# Patient Record
Sex: FEMALE | Race: WHITE | NOT HISPANIC OR LATINO | Employment: FULL TIME | URBAN - METROPOLITAN AREA
[De-identification: names, ages, dates, MRNs, and addresses within clinical notes are randomized per-mention and may not be internally consistent; named-entity substitution may affect disease eponyms.]

---

## 2017-01-02 ENCOUNTER — OFFICE VISIT - HEALTHEAST (OUTPATIENT)
Dept: FAMILY MEDICINE | Facility: CLINIC | Age: 52
End: 2017-01-02

## 2017-01-02 DIAGNOSIS — Z20.818 EXPOSURE TO STREP THROAT: ICD-10-CM

## 2017-01-02 DIAGNOSIS — J32.9 SINUSITIS: ICD-10-CM

## 2017-03-21 ENCOUNTER — HOSPITAL ENCOUNTER (OUTPATIENT)
Dept: MAMMOGRAPHY | Facility: CLINIC | Age: 52
Discharge: HOME | End: 2017-03-21
Attending: FAMILY MEDICINE

## 2017-03-21 DIAGNOSIS — Z12.31 VISIT FOR SCREENING MAMMOGRAM: ICD-10-CM

## 2017-06-21 ENCOUNTER — COMMUNICATION - HEALTHEAST (OUTPATIENT)
Dept: FAMILY MEDICINE | Facility: CLINIC | Age: 52
End: 2017-06-21

## 2017-06-21 DIAGNOSIS — I10 HYPERTENSION: ICD-10-CM

## 2017-12-26 ENCOUNTER — COMMUNICATION - HEALTHEAST (OUTPATIENT)
Dept: FAMILY MEDICINE | Facility: CLINIC | Age: 52
End: 2017-12-26

## 2017-12-26 DIAGNOSIS — I10 HYPERTENSION: ICD-10-CM

## 2018-01-12 ENCOUNTER — OFFICE VISIT - HEALTHEAST (OUTPATIENT)
Dept: FAMILY MEDICINE | Facility: CLINIC | Age: 53
End: 2018-01-12

## 2018-01-12 DIAGNOSIS — E55.9 VITAMIN D DEFICIENCY: ICD-10-CM

## 2018-01-12 DIAGNOSIS — Z12.11 SCREEN FOR COLON CANCER: ICD-10-CM

## 2018-01-12 DIAGNOSIS — I10 HYPERTENSION: ICD-10-CM

## 2018-01-12 DIAGNOSIS — I10 ESSENTIAL HYPERTENSION, BENIGN: ICD-10-CM

## 2018-01-12 LAB — TSH SERPL DL<=0.005 MIU/L-ACNC: 3.45 UIU/ML (ref 0.3–5)

## 2018-01-15 ENCOUNTER — COMMUNICATION - HEALTHEAST (OUTPATIENT)
Dept: FAMILY MEDICINE | Facility: CLINIC | Age: 53
End: 2018-01-15

## 2018-01-15 LAB — 25(OH)D3 SERPL-MCNC: 25.5 NG/ML (ref 30–80)

## 2018-02-09 ENCOUNTER — COMMUNICATION - HEALTHEAST (OUTPATIENT)
Dept: FAMILY MEDICINE | Facility: CLINIC | Age: 53
End: 2018-02-09

## 2018-02-12 ENCOUNTER — OFFICE VISIT - HEALTHEAST (OUTPATIENT)
Dept: FAMILY MEDICINE | Facility: CLINIC | Age: 53
End: 2018-02-12

## 2018-02-12 DIAGNOSIS — J01.90 ACUTE SINUSITIS: ICD-10-CM

## 2018-04-17 ENCOUNTER — COMMUNICATION - HEALTHEAST (OUTPATIENT)
Dept: FAMILY MEDICINE | Facility: CLINIC | Age: 53
End: 2018-04-17

## 2018-08-17 ENCOUNTER — AMBULATORY - HEALTHEAST (OUTPATIENT)
Dept: FAMILY MEDICINE | Facility: CLINIC | Age: 53
End: 2018-08-17

## 2019-01-11 ENCOUNTER — COMMUNICATION - HEALTHEAST (OUTPATIENT)
Dept: FAMILY MEDICINE | Facility: CLINIC | Age: 54
End: 2019-01-11

## 2019-01-11 DIAGNOSIS — I10 HYPERTENSION: ICD-10-CM

## 2019-04-15 ENCOUNTER — COMMUNICATION - HEALTHEAST (OUTPATIENT)
Dept: FAMILY MEDICINE | Facility: CLINIC | Age: 54
End: 2019-04-15

## 2019-04-15 DIAGNOSIS — I10 HYPERTENSION: ICD-10-CM

## 2019-07-16 ENCOUNTER — COMMUNICATION - HEALTHEAST (OUTPATIENT)
Dept: FAMILY MEDICINE | Facility: CLINIC | Age: 54
End: 2019-07-16

## 2019-07-16 DIAGNOSIS — I10 HYPERTENSION: ICD-10-CM

## 2019-10-04 ENCOUNTER — OFFICE VISIT - HEALTHEAST (OUTPATIENT)
Dept: FAMILY MEDICINE | Facility: CLINIC | Age: 54
End: 2019-10-04

## 2019-10-04 DIAGNOSIS — I10 ESSENTIAL HYPERTENSION, BENIGN: ICD-10-CM

## 2019-10-04 DIAGNOSIS — Z12.11 SPECIAL SCREENING FOR MALIGNANT NEOPLASMS, COLON: ICD-10-CM

## 2019-10-04 DIAGNOSIS — Z12.31 VISIT FOR SCREENING MAMMOGRAM: ICD-10-CM

## 2019-10-04 DIAGNOSIS — Z13.220 LIPID SCREENING: ICD-10-CM

## 2019-10-04 DIAGNOSIS — E55.9 VITAMIN D DEFICIENCY: ICD-10-CM

## 2019-10-07 ENCOUNTER — RECORDS - HEALTHEAST (OUTPATIENT)
Dept: MAMMOGRAPHY | Facility: CLINIC | Age: 54
End: 2019-10-07

## 2019-10-07 DIAGNOSIS — Z12.31 ENCOUNTER FOR SCREENING MAMMOGRAM FOR MALIGNANT NEOPLASM OF BREAST: ICD-10-CM

## 2020-04-06 ENCOUNTER — COMMUNICATION - HEALTHEAST (OUTPATIENT)
Dept: FAMILY MEDICINE | Facility: CLINIC | Age: 55
End: 2020-04-06

## 2020-04-06 DIAGNOSIS — I10 ESSENTIAL HYPERTENSION, BENIGN: ICD-10-CM

## 2020-04-30 ENCOUNTER — COMMUNICATION - HEALTHEAST (OUTPATIENT)
Dept: FAMILY MEDICINE | Facility: CLINIC | Age: 55
End: 2020-04-30

## 2020-10-06 ENCOUNTER — COMMUNICATION - HEALTHEAST (OUTPATIENT)
Dept: FAMILY MEDICINE | Facility: CLINIC | Age: 55
End: 2020-10-06

## 2020-10-06 DIAGNOSIS — I10 ESSENTIAL HYPERTENSION, BENIGN: ICD-10-CM

## 2020-11-14 ENCOUNTER — AMBULATORY - HEALTHEAST (OUTPATIENT)
Dept: NURSING | Facility: CLINIC | Age: 55
End: 2020-11-14

## 2021-01-07 ENCOUNTER — OFFICE VISIT - HEALTHEAST (OUTPATIENT)
Dept: FAMILY MEDICINE | Facility: CLINIC | Age: 56
End: 2021-01-07

## 2021-01-07 DIAGNOSIS — Z12.11 SPECIAL SCREENING FOR MALIGNANT NEOPLASMS, COLON: ICD-10-CM

## 2021-01-07 DIAGNOSIS — E05.90 HYPERTHYROIDISM: ICD-10-CM

## 2021-01-07 DIAGNOSIS — D64.9 ANEMIA, UNSPECIFIED TYPE: ICD-10-CM

## 2021-01-07 DIAGNOSIS — Z00.00 ROUTINE HISTORY AND PHYSICAL EXAMINATION OF ADULT: ICD-10-CM

## 2021-01-07 DIAGNOSIS — Z82.49 FAMILY HISTORY OF ISCHEMIC HEART DISEASE: ICD-10-CM

## 2021-01-07 DIAGNOSIS — I10 ESSENTIAL HYPERTENSION, BENIGN: ICD-10-CM

## 2021-01-07 DIAGNOSIS — R55 PRE-SYNCOPE: ICD-10-CM

## 2021-01-07 DIAGNOSIS — E55.9 VITAMIN D DEFICIENCY: ICD-10-CM

## 2021-01-07 DIAGNOSIS — R00.2 PALPITATIONS: ICD-10-CM

## 2021-01-07 LAB
BASOPHILS # BLD AUTO: 0 THOU/UL (ref 0–0.2)
BASOPHILS NFR BLD AUTO: 1 % (ref 0–2)
EOSINOPHIL # BLD AUTO: 0.2 THOU/UL (ref 0–0.4)
EOSINOPHIL NFR BLD AUTO: 2 % (ref 0–6)
ERYTHROCYTE [DISTWIDTH] IN BLOOD BY AUTOMATED COUNT: 12.4 % (ref 11–14.5)
HCT VFR BLD AUTO: 41.5 % (ref 35–47)
HGB BLD-MCNC: 14.5 G/DL (ref 12–16)
LYMPHOCYTES # BLD AUTO: 2.1 THOU/UL (ref 0.8–4.4)
LYMPHOCYTES NFR BLD AUTO: 30 % (ref 20–40)
MCH RBC QN AUTO: 28.8 PG (ref 27–34)
MCHC RBC AUTO-ENTMCNC: 35 G/DL (ref 32–36)
MCV RBC AUTO: 82 FL (ref 80–100)
MONOCYTES # BLD AUTO: 0.5 THOU/UL (ref 0–0.9)
MONOCYTES NFR BLD AUTO: 7 % (ref 2–10)
NEUTROPHILS # BLD AUTO: 4.1 THOU/UL (ref 2–7.7)
NEUTROPHILS NFR BLD AUTO: 60 % (ref 50–70)
PLATELET # BLD AUTO: 200 THOU/UL (ref 140–440)
PMV BLD AUTO: 7.8 FL (ref 7–10)
RBC # BLD AUTO: 5.03 MILL/UL (ref 3.8–5.4)
WBC: 6.9 THOU/UL (ref 4–11)

## 2021-01-07 NOTE — ASSESSMENT & PLAN NOTE
"3 weeks    Flutter  Daily   Few seconds 3-10 seconds   \"quick\"   Throat Tight right after >>>> lightheaded   If I do not sit I feel lie pass out     Triggers?  No    Caffeine? 2 cups     Hear pulse in ear  Feel pulse racing   \"feels off\"     Wake up frequently       +snore   RREsted?  For the most part   Skimping sleep  11:30 -- 12:00 - 5:30             "

## 2021-01-08 LAB
ALBUMIN SERPL-MCNC: 4.2 G/DL (ref 3.5–5)
ALP SERPL-CCNC: 51 U/L (ref 45–120)
ALT SERPL W P-5'-P-CCNC: 23 U/L (ref 0–45)
ANION GAP SERPL CALCULATED.3IONS-SCNC: 9 MMOL/L (ref 5–18)
AST SERPL W P-5'-P-CCNC: 18 U/L (ref 0–40)
ATRIAL RATE - MUSE: 65 BPM
BILIRUB SERPL-MCNC: 0.7 MG/DL (ref 0–1)
BUN SERPL-MCNC: 10 MG/DL (ref 8–22)
CALCIUM SERPL-MCNC: 9.5 MG/DL (ref 8.5–10.5)
CHLORIDE BLD-SCNC: 104 MMOL/L (ref 98–107)
CHOLEST SERPL-MCNC: 207 MG/DL
CO2 SERPL-SCNC: 28 MMOL/L (ref 22–31)
CREAT SERPL-MCNC: 0.75 MG/DL (ref 0.6–1.1)
DIASTOLIC BLOOD PRESSURE - MUSE: NORMAL
FASTING STATUS PATIENT QL REPORTED: YES
GFR SERPL CREATININE-BSD FRML MDRD: >60 ML/MIN/1.73M2
GLUCOSE BLD-MCNC: 85 MG/DL (ref 70–125)
HDLC SERPL-MCNC: 58 MG/DL
INTERPRETATION ECG - MUSE: NORMAL
LDLC SERPL CALC-MCNC: 128 MG/DL
P AXIS - MUSE: 36 DEGREES
POTASSIUM BLD-SCNC: 4.2 MMOL/L (ref 3.5–5)
PR INTERVAL - MUSE: 142 MS
PROT SERPL-MCNC: 6.9 G/DL (ref 6–8)
QRS DURATION - MUSE: 70 MS
QT - MUSE: 418 MS
QTC - MUSE: 434 MS
R AXIS - MUSE: 51 DEGREES
SODIUM SERPL-SCNC: 141 MMOL/L (ref 136–145)
SYSTOLIC BLOOD PRESSURE - MUSE: NORMAL
T AXIS - MUSE: 50 DEGREES
T4 FREE SERPL-MCNC: 1 NG/DL (ref 0.7–1.8)
T4 TOTAL - HISTORICAL: 8.5 UG/DL (ref 4.5–13)
TRIGL SERPL-MCNC: 106 MG/DL
TSH SERPL DL<=0.005 MIU/L-ACNC: 2.55 UIU/ML (ref 0.3–5)
VENTRICULAR RATE- MUSE: 65 BPM

## 2021-01-10 LAB — THYROID STIMULATING IMMUNOGLOBULIN-TSI - HISTORICAL: <0.1 IU/L

## 2021-01-11 LAB
25(OH)D3 SERPL-MCNC: 45.5 NG/ML (ref 30–80)
25(OH)D3 SERPL-MCNC: 45.5 NG/ML (ref 30–80)

## 2021-01-12 LAB — THYROID PEROXIDASE ANTIBODIES - HISTORICAL: 26.1 IU/ML (ref 0–5.6)

## 2021-02-08 ENCOUNTER — COMMUNICATION - HEALTHEAST (OUTPATIENT)
Dept: FAMILY MEDICINE | Facility: CLINIC | Age: 56
End: 2021-02-08

## 2021-02-11 ENCOUNTER — HOSPITAL ENCOUNTER (OUTPATIENT)
Dept: CARDIOLOGY | Facility: CLINIC | Age: 56
Discharge: HOME | End: 2021-02-11
Attending: FAMILY MEDICINE

## 2021-02-11 DIAGNOSIS — I10 ESSENTIAL HYPERTENSION, BENIGN: ICD-10-CM

## 2021-02-11 DIAGNOSIS — R55 PRE-SYNCOPE: ICD-10-CM

## 2021-02-11 DIAGNOSIS — Z82.49 FAMILY HISTORY OF ISCHEMIC HEART DISEASE: ICD-10-CM

## 2021-02-11 DIAGNOSIS — R00.2 PALPITATIONS: ICD-10-CM

## 2021-02-23 ENCOUNTER — COMMUNICATION - HEALTHEAST (OUTPATIENT)
Dept: FAMILY MEDICINE | Facility: CLINIC | Age: 56
End: 2021-02-23

## 2021-02-23 ENCOUNTER — HOSPITAL ENCOUNTER (OUTPATIENT)
Dept: CT IMAGING | Facility: CLINIC | Age: 56
Discharge: HOME | End: 2021-02-23
Attending: FAMILY MEDICINE

## 2021-02-23 DIAGNOSIS — I10 ESSENTIAL HYPERTENSION, BENIGN: ICD-10-CM

## 2021-02-23 DIAGNOSIS — R55 PRE-SYNCOPE: ICD-10-CM

## 2021-02-23 DIAGNOSIS — Z82.49 FAMILY HISTORY OF ISCHEMIC HEART DISEASE: ICD-10-CM

## 2021-02-23 DIAGNOSIS — R00.2 PALPITATIONS: ICD-10-CM

## 2021-02-23 LAB
BSA FOR ECHO PROCEDURE: 2.01 M2
CREAT BLD-MCNC: 0.7 MG/DL (ref 0.6–1.1)
CV CALCIUM SCORE AGATSTON LM: 0
CV CALCIUM SCORING AGATSON LAD: 0
CV CALCIUM SCORING AGATSTON CX: 0
CV CALCIUM SCORING AGATSTON RCA: 0
CV CALCIUM SCORING AGATSTON TOTAL: 0
GFR SERPL CREATININE-BSD FRML MDRD: >60 ML/MIN/1.73M2
LEFT VENTRICLE HEART RATE: 80 BPM

## 2021-02-23 ASSESSMENT — MIFFLIN-ST. JEOR: SCORE: 1484.25

## 2021-04-05 ENCOUNTER — OFFICE VISIT - HEALTHEAST (OUTPATIENT)
Dept: FAMILY MEDICINE | Facility: CLINIC | Age: 56
End: 2021-04-05

## 2021-04-05 DIAGNOSIS — R53.83 FATIGUE, UNSPECIFIED TYPE: ICD-10-CM

## 2021-04-05 DIAGNOSIS — R76.8 ANTI-TPO ANTIBODIES PRESENT: ICD-10-CM

## 2021-04-05 DIAGNOSIS — I10 ESSENTIAL HYPERTENSION, BENIGN: ICD-10-CM

## 2021-04-05 DIAGNOSIS — Z12.39 ENCOUNTER FOR OTHER SCREENING FOR MALIGNANT NEOPLASM OF BREAST: ICD-10-CM

## 2021-04-05 DIAGNOSIS — R06.83 LOUD SNORING: ICD-10-CM

## 2021-04-05 DIAGNOSIS — E66.01 MORBID OBESITY (H): ICD-10-CM

## 2021-04-05 RX ORDER — METOPROLOL SUCCINATE 50 MG/1
50 TABLET, EXTENDED RELEASE ORAL DAILY
Qty: 90 TABLET | Refills: 1 | Status: SHIPPED | OUTPATIENT
Start: 2021-04-05 | End: 2021-10-07

## 2021-04-08 ENCOUNTER — HOSPITAL ENCOUNTER (OUTPATIENT)
Dept: ULTRASOUND IMAGING | Facility: CLINIC | Age: 56
Discharge: HOME | End: 2021-04-08
Attending: FAMILY MEDICINE

## 2021-04-08 ENCOUNTER — COMMUNICATION - HEALTHEAST (OUTPATIENT)
Dept: FAMILY MEDICINE | Facility: CLINIC | Age: 56
End: 2021-04-08

## 2021-04-08 ENCOUNTER — RECORDS - HEALTHEAST (OUTPATIENT)
Dept: MAMMOGRAPHY | Facility: CLINIC | Age: 56
End: 2021-04-08

## 2021-04-08 DIAGNOSIS — Z12.39 ENCOUNTER FOR OTHER SCREENING FOR MALIGNANT NEOPLASM OF BREAST: ICD-10-CM

## 2021-04-08 DIAGNOSIS — R76.8 ANTI-TPO ANTIBODIES PRESENT: ICD-10-CM

## 2021-04-10 ENCOUNTER — AMBULATORY - HEALTHEAST (OUTPATIENT)
Dept: NURSING | Facility: CLINIC | Age: 56
End: 2021-04-10

## 2021-04-13 ENCOUNTER — COMMUNICATION - HEALTHEAST (OUTPATIENT)
Dept: FAMILY MEDICINE | Facility: CLINIC | Age: 56
End: 2021-04-13

## 2021-05-24 ENCOUNTER — RECORDS - HEALTHEAST (OUTPATIENT)
Dept: ADMINISTRATIVE | Facility: CLINIC | Age: 56
End: 2021-05-24

## 2021-05-25 ENCOUNTER — RECORDS - HEALTHEAST (OUTPATIENT)
Dept: ADMINISTRATIVE | Facility: CLINIC | Age: 56
End: 2021-05-25

## 2021-05-26 ENCOUNTER — RECORDS - HEALTHEAST (OUTPATIENT)
Dept: ADMINISTRATIVE | Facility: CLINIC | Age: 56
End: 2021-05-26

## 2021-05-27 ENCOUNTER — RECORDS - HEALTHEAST (OUTPATIENT)
Dept: ADMINISTRATIVE | Facility: CLINIC | Age: 56
End: 2021-05-27

## 2021-05-27 NOTE — TELEPHONE ENCOUNTER
RN cannot approve Refill Request    RN can NOT refill this medication PCP messaged that patient is overdue for Office Visit.      Sofy Patel, [facility] Triage/Med Refill 4/16/2019    Requested Prescriptions   Pending Prescriptions Disp Refills     metoprolol succinate (TOPROL-XL) 50 MG 24 hr tablet [Pharmacy Med Name: METOPROLOL ER SUCCINATE 50MG TABS] 90 tablet 0     Sig: TAKE 1 TABLET BY MOUTH DAILY       Beta-Blockers Refill Protocol Failed - 4/15/2019  3:13 AM        Failed - PCP or prescribing provider visit in past 12 months or next 3 months     Last office visit with prescriber/PCP: 1/12/2018 Duglas Padron MD OR same dept: Visit date not found OR same specialty: 1/12/2018 Duglas Padron MD  Last physical: Visit date not found Last MTM visit: Visit date not found   Next visit within 3 mo: Visit date not found  Next physical within 3 mo: Visit date not found  Prescriber OR PCP: Duglas Padron MD  Last diagnosis associated with med order: 1. Hypertension  - metoprolol succinate (TOPROL-XL) 50 MG 24 hr tablet [Pharmacy Med Name: METOPROLOL ER SUCCINATE 50MG TABS]; TAKE 1 TABLET BY MOUTH DAILY  Dispense: 90 tablet; Refill: 0    If protocol passes may refill for 12 months if within 3 months of last provider visit (or a total of 15 months).             Failed - Blood pressure filed in past 12 months     BP Readings from Last 1 Encounters:   01/12/18 136/88

## 2021-05-28 ENCOUNTER — RECORDS - HEALTHEAST (OUTPATIENT)
Dept: ADMINISTRATIVE | Facility: CLINIC | Age: 56
End: 2021-05-28

## 2021-05-30 ENCOUNTER — RECORDS - HEALTHEAST (OUTPATIENT)
Dept: ADMINISTRATIVE | Facility: CLINIC | Age: 56
End: 2021-05-30

## 2021-05-30 NOTE — TELEPHONE ENCOUNTER
Pt's  was informed of the providers response.  He states that he will pass the message on to the pt.

## 2021-05-30 NOTE — TELEPHONE ENCOUNTER
RN cannot approve Refill Request    RN can NOT refill this medication PCP messaged that patient is overdue for Office Visit. Last office visit: 1/12/2018 Duglas Padron MD Last Physical: Visit date not found Last MTM visit: Visit date not found Last visit same specialty: 1/12/2018 Duglas Padron MD.  Next visit within 3 mo: Visit date not found  Next physical within 3 mo: Visit date not found      Phyllis FERRIS Preeti, [facility] Triage/Med Refill 7/16/2019    Requested Prescriptions   Pending Prescriptions Disp Refills     metoprolol succinate (TOPROL-XL) 50 MG 24 hr tablet [Pharmacy Med Name: METOPROLOL ER SUCCINATE 50MG TABS] 90 tablet 0     Sig: TAKE 1 TABLET BY MOUTH DAILY       Beta-Blockers Refill Protocol Failed - 7/16/2019  8:26 AM        Failed - PCP or prescribing provider visit in past 12 months or next 3 months     Last office visit with prescriber/PCP: 1/12/2018 Duglas Padron MD OR same dept: Visit date not found OR same specialty: 1/12/2018 Duglas Padron MD  Last physical: Visit date not found Last MTM visit: Visit date not found   Next visit within 3 mo: Visit date not found  Next physical within 3 mo: Visit date not found  Prescriber OR PCP: Duglas Padron MD  Last diagnosis associated with med order: 1. Hypertension  - metoprolol succinate (TOPROL-XL) 50 MG 24 hr tablet [Pharmacy Med Name: METOPROLOL ER SUCCINATE 50MG TABS]; TAKE 1 TABLET BY MOUTH DAILY  Dispense: 90 tablet; Refill: 0    If protocol passes may refill for 12 months if within 3 months of last provider visit (or a total of 15 months).             Failed - Blood pressure filed in past 12 months     BP Readings from Last 1 Encounters:   01/12/18 136/88

## 2021-05-31 ENCOUNTER — RECORDS - HEALTHEAST (OUTPATIENT)
Dept: ADMINISTRATIVE | Facility: CLINIC | Age: 56
End: 2021-05-31

## 2021-06-01 ENCOUNTER — RECORDS - HEALTHEAST (OUTPATIENT)
Dept: ADMINISTRATIVE | Facility: CLINIC | Age: 56
End: 2021-06-01

## 2021-06-02 ENCOUNTER — RECORDS - HEALTHEAST (OUTPATIENT)
Dept: ADMINISTRATIVE | Facility: CLINIC | Age: 56
End: 2021-06-02

## 2021-06-02 NOTE — PROGRESS NOTES
Assessment and Plan    1. Benign Essential Hypertension  Well controlled on:   - metoprolol succinate (TOPROL-XL) 50 MG 24 hr tablet; Take 1 tablet (50 mg total) by mouth daily.  Dispense: 90 tablet; Refill: 1  Monitor kidney function with  - Basic Metabolic Panel; Standing    2. Vitamin D deficiency  Assess efficacy of supplementation with:  - Vitamin D, Total (25-Hydroxy); Standing    3. Lipid screening  - Lipid [place] FASTING; Standing  She will come back for fasting labs    4. Visit for screening mammogram  - Mammo Screening Bilateral; Future    5. Special screening for malignant neoplasms, colon  - Cologuard      Return soon, for annual/pap smear.    Mel Copeland MD     -------------------------------------------    Chief Complaint   Patient presents with     Blood Pressure Check     f/u     Lacey usually sees Dr. Padron at this clinic but has had a difficult time scheduling a visit, so she comes to see me      She has had hypertension - for a long time    BP Readings from Last 3 Encounters:   10/04/19 132/80   01/12/18 136/88   01/02/17 130/72      - she never checks blood pressure   - she can tell the rare times when her blood pressure is elevated because her body gets warm, she develops a headache, and her pulse is up   -no chest pains, palpitations or dyspnea    Vitamin D deficiency  She is taking 4000 iu daily vitamin D after having a very low level, but has not had it checked since     Ref Range & Units  6/24/16 0920      Vitamin D, Total (25-Hydroxy) 30.0 - 80.0 ng/mL 18.5Low           She does not have Intermittent asthma on her diagnosis, but she does use albuterol if needed with upper respiratory infection       Current Outpatient Medications on File Prior to Visit   Medication Sig Dispense Refill     albuterol (PROVENTIL) 2.5 mg /3 mL (0.083 %) nebulizer solution Take 3 mL (2.5 mg total) by nebulization every 4 (four) hours as needed for wheezing or shortness of breath (cough). 30 vial 0      albuterol (PROVENTIL) 2.5 mg /3 mL (0.083 %) nebulizer solution Take 3 mL (2.5 mg total) by nebulization every 6 (six) hours as needed for wheezing. 75 mL 3     No current facility-administered medications on file prior to visit.          Health Maintenance Due   Topic Date Due     PREVENTIVE CARE VISIT  1966     HIV SCREENING  10/20/1981     COLONOSCOPY  10/20/2016     ZOSTER VACCINES (1 of 2) 10/20/2016     MAMMOGRAM  03/21/2018     PAP SMEAR  07/16/2018     HPV TEST  07/16/2018     Health Maintenance reviewed - she should come in for annual exam, but is find to have some orders done today     The history section was last reviewed by Mable Berger CMA on Jan 12, 2018.    Social History     Tobacco Use   Smoking Status Never Smoker   Smokeless Tobacco Never Used       Social History     Substance and Sexual Activity   Alcohol Use Not on file         Vitals:    10/04/19 1503   BP: 132/80   Pulse: 66   SpO2: 98%     There is no height or weight on file to calculate BMI.     EXAM:    General appearance - alert, well appearing, and in no distress  Mental status - normal mood, behavior, speech, dress, motor activity, and thought processes  Eyes - funduscopic exam normal, discs flat and sharp  Ears - bilateral TM's and external ear canals normal  Mouth - mucous membranes moist, pharynx normal without lesions  Neck - supple, no significant adenopathy, carotids upstroke normal bilaterally, no bruits  Chest - clear to auscultation, no wheezes, rales or rhonchi, symmetric air entry  Heart - normal rate, regular rhythm, normal S1, S2, no murmurs, rubs, clicks or gallops  Abdomen - soft, nontender, nondistended, no masses or organomegaly  Extremities - peripheral pulses normal, no pedal edema, no clubbing or cyanosis

## 2021-06-03 VITALS — DIASTOLIC BLOOD PRESSURE: 80 MMHG | SYSTOLIC BLOOD PRESSURE: 132 MMHG | HEART RATE: 66 BPM | OXYGEN SATURATION: 98 %

## 2021-06-05 VITALS — HEIGHT: 64 IN | WEIGHT: 200 LBS | BODY MASS INDEX: 34.15 KG/M2

## 2021-06-05 VITALS
TEMPERATURE: 98.3 F | BODY MASS INDEX: 35.55 KG/M2 | WEIGHT: 203.9 LBS | DIASTOLIC BLOOD PRESSURE: 82 MMHG | SYSTOLIC BLOOD PRESSURE: 128 MMHG | HEART RATE: 77 BPM | OXYGEN SATURATION: 96 %

## 2021-06-05 VITALS — DIASTOLIC BLOOD PRESSURE: 92 MMHG | SYSTOLIC BLOOD PRESSURE: 146 MMHG | OXYGEN SATURATION: 99 % | HEART RATE: 68 BPM

## 2021-06-08 NOTE — PROGRESS NOTES
"ASSESSMENT:   1. Sinusitis     2. Exposure to strep throat  Rapid Strep A Screen-Throat    Group A Strep, RNA Direct Detection, Throat               PLAN:  Rapid strep test was negative. We will wait until tomorrow for the confirmatory RNA strep test results. If positive, will prescribe an antibiotic. If not, this is most likely viral and antibiotic therapy will not help for this.    You may continue to do symptomatic treatment such as salt water gargles, teas, and throat drops. You may also use ibuprofen or Tylenol for pain or fever.    For the sinuses, likely this is still viral in nature and unlikely to respond to antibiotic treatment, given that symptoms of sinus pressure have been present for less than 2 weeks. The best treatment for a viral sinusitis is Mucinex, nasal saline, nasal steroid such as Flonase, and ibuprofen to decrease sinus swelling.    If you continue to have symptoms, began to have unremitting fever, shortness of breath, are unable to swallow fluids or manage own secretions, return for evaluation immediately. Otherwise, if not getting better in the next week, follow up with primary care provider.      At the end of the encounter, I discussed results, diagnosis, medications. Discussed red flags for immediate return to clinic/ER, as well as indications for follow up if no improvement. Patient understood and agreed to plan. Patient was stable for discharge.             SUBJECTIVE:   Lacey Kennedy is a 51 y.o. female who presents today for evaluation of sore throat for the last 2 days. She admits to headache. She also has had right sinus pressure and jaw pain. She is blowing purulent mucous out of her nose. Her ears feel \"pressured.\" She does feel like she has some post-nasal drip. No fevers. No cough. No vision changes, hearing changes, neck stiffness, nausea, vomiting, or anorexia. Her daughter has a sore throat, and they were both exposed to Strep last week.    Past Medical " History:  Patient Active Problem List   Diagnosis     Benign Essential Hypertension     Primary Vitiligo     Vitamin D Deficiency     Iron Deficiency     Anemia     Obesity (BMI 30.0-34.9)       Surgical History:  Reviewed; Non-contributory    Family History:  Reviewed; Non-contributory      Social History:    History   Smoking Status     Never Smoker   Smokeless Tobacco     Never Used     Smoking: none  Alcohol use: none  Other drug use: none  Occupation: works at [employer]        Current Medications:  Current Outpatient Prescriptions on File Prior to Visit   Medication Sig Dispense Refill     albuterol (PROVENTIL) 2.5 mg /3 mL (0.083 %) nebulizer solution Take 3 mL (2.5 mg total) by nebulization every 4 (four) hours as needed for wheezing or shortness of breath (cough). 30 vial 0     metoprolol succinate (TOPROL-XL) 50 MG 24 hr tablet TAKE 1 TABLET BY MOUTH DAILY 90 tablet 3     No current facility-administered medications on file prior to visit.        Allergies:   Allergies   Allergen Reactions     Sodium Phenylbutyrate        I personally reviewed patient's past medical, surgical, social, family history and allergies.    ROS:  Review of Systems  See HPI      OBJECTIVE:   Vitals:    01/02/17 1821   BP: 130/72   Pulse: 73   Resp: 14   Temp: 98.6  F (37  C)   TempSrc: Oral   SpO2: 98%       General Appearance:  Alert, well-appearing female in NAD. Afebrile.   HEENT:  Head: Atraumatic, normocephalic. Face nontraumatic.  Eyes: Conjunctiva clear, Lids normal.  Ears:  Left TM pearly, translucent. Right TM with thick fluid bubbling behind it. No canal erythema or edema.  Nose: nares patent. Mild erythema of nasal mucosa. No rhinorrhea. + maxillary sinus tenderness. No frontal sinus tenderness.  Oropharynx: No trismus. Mild posterior pharyngeal erythema. No tonsillar hypertrophy. Uvula midline. Moist mucus membranes.  Neck: Supple, no lymphadenopathy. No meningismus.  Respiratory: No distress. Lungs clear to ausculation  bilaterally. No crackles, wheezes, rhonchi or stridor.  Cardiovascular: Regular rate and rhythm, no murmur, rub or gallop. No obvious chest wall deformities.   Neurologic: Alert and orientated appropriately. No focal deficits.             Laboratory Studies:  I personally ordered and interpreted these studies.    Results for orders placed or performed in visit on 01/02/17   Rapid Strep A Screen-Throat   Result Value Ref Range    Rapid Strep A Antigen No Group A Strep detected No Group A Strep detected

## 2021-06-12 NOTE — TELEPHONE ENCOUNTER
RN cannot approve Refill Request    RN can NOT refill this medication Protocol failed and NO refill given. Last office visit: 1/12/2018 Duglas Padron MD Last Physical: Visit date not found Last MTM visit: Visit date not found Last visit same specialty: 10/4/2019 Mel Copeland MD.  Next visit within 3 mo: Visit date not found  Next physical within 3 mo: Visit date not found      Sfoy Patel, [facility] Connection Triage/Med Refill 10/7/2020    Requested Prescriptions   Pending Prescriptions Disp Refills     metoprolol succinate (TOPROL-XL) 50 MG 24 hr tablet 90 tablet 1     Sig: Take 1 tablet (50 mg total) by mouth daily.       Beta-Blockers Refill Protocol Failed - 10/6/2020  5:43 PM        Failed - PCP or prescribing provider visit in past 12 months or next 3 months     Last office visit with prescriber/PCP: 1/12/2018 Duglas Padron MD OR same dept: Visit date not found OR same specialty: 10/4/2019 Mel Copeland MD  Last physical: Visit date not found Last MTM visit: Visit date not found   Next visit within 3 mo: Visit date not found  Next physical within 3 mo: Visit date not found  Prescriber OR PCP: Duglas Padron MD  Last diagnosis associated with med order: 1. Benign Essential Hypertension  - metoprolol succinate (TOPROL-XL) 50 MG 24 hr tablet; Take 1 tablet (50 mg total) by mouth daily.  Dispense: 90 tablet; Refill: 1    If protocol passes may refill for 12 months if within 3 months of last provider visit (or a total of 15 months).             Failed - Blood pressure filed in past 12 months     BP Readings from Last 1 Encounters:   10/04/19 132/80

## 2021-06-14 NOTE — PROGRESS NOTES
"ASSESSMENT & PLAN    Palpitations  3 weeks    Flutter  Daily   Few seconds 3-10 seconds   \"quick\"   Throat Tight right after >>>> lightheaded   If I do not sit I feel lie pass out     Triggers?  No    Caffeine? 2 cups     Hear pulse in ear  Feel pulse racing   \"feels off\"     Wake up frequently       +snore   [place]?  For the most part   Skimping sleep  11:30 -- 12:00 - 5:30                 Lacey was seen today for dizziness and palpitations.    Diagnoses and all orders for this visit:    Routine history and physical examination of adult  -     JIC RED  -     JIC LAV  -     JIC ROB  -     JIC GRN    Palpitations  -     CTA Coronary W Calcium Score; Future  -     LONA Monitor Hook-Up; Future    Vitamin D deficiency  -     Vitamin D, Total (25-Hydroxy)    Benign Essential Hypertension  -     Comprehensive Metabolic Panel  -     Lipid [place]  -     CTA Coronary W Calcium Score; Future  -     LONA Monitor Hook-Up; Future    Anemia, unspecified type  -     HM1(CBC and Differential)    Pre-syncope  -     Electrocardiogram Perform and Read  -     CTA Coronary W Calcium Score; Future  -     LONA Monitor Hook-Up; Future    Hyperthyroidism  -     T4, Free  -     T4, Total  -     Thyroid Peroxidase Antibody  -     Thyroid Stimulating Hormone (TSH)  -     Thyroid-Stimulating Immunoglobulin  -     Electrocardiogram Perform and Read    Family history of ischemic heart disease  -     CTA Coronary W Calcium Score; Future  -     LONA Monitor Hook-Up; Future        There are no Patient Instructions on file for this visit.    No follow-ups on file.            CHIEF COMPLAINT: Lacey Kennedy had concerns including Dizziness (experiences dizziness right after getting her palpitation episodes) and Palpitations (x 3 weeks, gets small instances of palpitations lasting about 5-10 seconds at a time).    [place]: 1.............. SUBJECTIVE:  Lacey Kennedy is a 55 y.o. female had concerns including Dizziness (experiences " dizziness right after getting her palpitation episodes) and Palpitations (x 3 weeks, gets small instances of palpitations lasting about 5-10 seconds at a time).    1. Routine history and physical examination of adult    2. Palpitations    3. Vitamin D deficiency    4. Benign Essential Hypertension    5. Anemia, unspecified type    6. Pre-syncope    7. Hyperthyroidism    8. Family history of ischemic heart disease      Lacey comes in today family history of heart disease her sister  at 55 of a heart attack and also family history of thyroid cancer    She comes in she has had 3-week history of fluttering sensation in her throat  It happens last a few seconds she describes it 3 to 5 seconds usually right afterwards if she does not sit down she feels that she might pass out  There is no rhyme or reason  She has been less active during the pandemic    She is a  in a job that she likes    She has had a history of Graves' disease remotely    No obvious chest pain brought on by exertion relieved by rest    She can hear her heartbeat in her ear      Family history parents  from cancer        Allergies   Allergen Reactions     Sodium Phenylbutyrate                          SOCIAL: She  reports that she has never smoked. She has never used smokeless tobacco.    REVIEW OF SYSTEMS:   Family history not pertinent to chief complaint or presenting problem    Review of systems otherwise negative as requested from patient, except   Those positive ROS outlined and discussed in [place].      VITALS:  Vitals:    21 1732   BP: (!) 146/92   Patient Site: Left Arm   Patient Position: Sitting   Cuff Size: Adult Regular   Pulse: 68   SpO2: 99%     Wt Readings from Last 3 Encounters:   No data found for Wt     There is no height or weight on file to calculate BMI.    Physical Exam:  She is a pleasant woman she is in no distress she is talkative orange of affect  No appreciable carotid bruits  Thyroid palpable I did  not appreciate enlargement or nodules  Lungs are clear  Cardiac S1-S2 soft systolic murmur right upper sternal border no diastolic component no abnormal valvular sounds  No gallop  Lower extremities without edema  No tremor  She has vitiligo         I spent 40  minutes with this patient.  This includes pre-visit, intra-visit and post visit work an evaluation with regards to Lacey was seen today for dizziness and palpitations.    Diagnoses and all orders for this visit:    Routine history and physical examination of adult  -     JIC RED  -     JIC LAV  -     JIC ROB  -     JIC GRN    Palpitations  -     CTA Coronary W Calcium Score; Future  -     LONA Monitor Hook-Up; Future    Vitamin D deficiency  -     Vitamin D, Total (25-Hydroxy)    Benign Essential Hypertension  -     Comprehensive Metabolic Panel  -     Lipid [place]  -     CTA Coronary W Calcium Score; Future  -     LONA Monitor Hook-Up; Future    Anemia, unspecified type  -     HM1(CBC and Differential)    Pre-syncope  -     Electrocardiogram Perform and Read  -     CTA Coronary W Calcium Score; Future  -     LONA Monitor Hook-Up; Future    Hyperthyroidism  -     T4, Free  -     T4, Total  -     Thyroid Peroxidase Antibody  -     Thyroid Stimulating Hormone (TSH)  -     Thyroid-Stimulating Immunoglobulin  -     Electrocardiogram Perform and Read    Family history of ischemic heart disease  -     CTA Coronary W Calcium Score; Future  -     LONA Monitor Hook-Up; Future        Duglas Padron MD  Family Medicine   [facility] [address] [phone]

## 2021-06-14 NOTE — PATIENT INSTRUCTIONS - HE
It was great to see you today Lacey    We are going to do blood work today    I have ordered a CT angiogram which will look at your coronary arteries to see if you have any evidence of coronary disease    I have ordered a event monitor  This will help to discern whether the palpitations you are experiencing are more in line or have properties of an abnormality    Other things to think about  Snoring and lack of sleep can lead to dysfunction of several systems including the nervous system  Would definitely encourage you to try and get consistently 6 to 8 hours of sleep if possible    If we do not get an answer from these tests consider doing a sleep study to see if you have symptomatic sleep apnea    Limit caffeine 2 cups is not a lot    Continue to drink water throughout the day    Lets hope we can come up with an answer as to why you are having the symptoms    I will send out another Cologuard test although I prefer that you do a colonoscopy    Do your 3D mammogram here at the [place] [facility]    Ensure you are keeping up on Pap smears cytospins any alcohol INR 1 from at this time    Lets plan on having a phone visit to follow-up on testing after all of her tests are back    Your blood pressure was initially a little bit high but 136/84 is a very reasonable blood pressure  Do not forget to do breathing exercises as these can short-circuit the sympathetic nervous system    See how to provide at home    [place]

## 2021-06-15 NOTE — PROGRESS NOTES
ASSESSMENT & PLAN      Diagnoses and all orders for this visit:    Screen for colon cancer  -     Ambulatory referral for Colonoscopy    Hypertension  -     metoprolol succinate (TOPROL-XL) 50 MG 24 hr tablet; TAKE 1 TABLET BY MOUTH DAILY    Benign Essential Hypertension  -     Thyroid Cascade    Vitamin D deficiency  -     Vitamin D, Total (25-Hydroxy)    Other orders  -     Tdap vaccine greater than or equal to 8yo IM        No Follow-up on file.           CHIEF COMPLAINT: Lacey Kennedy had no chief complaint listed for this encounter.    [place]: 1.............. had no chief complaint listed for this encounter.    1. Screen for colon cancer    2. Hypertension    3. Benign Essential Hypertension    4. Vitamin D deficiency      No problem-specific Assessment & Plan notes found for this encounter.      CC:              Med check. Fell 2 days ago. Left knee pain      Metoprolol      Fall on Left   On ice  Had to put pressure on the medial side  ++ effusion      What's it like:                      Pain in left knee. Bruise on right elbow  How long is it ongoin days  What makes it worse :       na  What makes it better:         na  010-10/10:Pain/Intesity     Only hurts when putting pressure on it      Any associated Sx to above complaint:   na        SUBJECTIVE:  Lacey Kennedy is a 52 y.o. female    No past medical history on file.  No past surgical history on file.  Sodium phenylbutyrate  Current Outpatient Prescriptions   Medication Sig Dispense Refill     albuterol (PROVENTIL) 2.5 mg /3 mL (0.083 %) nebulizer solution Take 3 mL (2.5 mg total) by nebulization every 4 (four) hours as needed for wheezing or shortness of breath (cough). 30 vial 0     metoprolol succinate (TOPROL-XL) 50 MG 24 hr tablet TAKE 1 TABLET BY MOUTH DAILY 90 tablet 3     No current facility-administered medications for this visit.      No family history on file.  Social History     Social History     Marital  status:      Spouse name: N/A     Number of children: N/A     Years of education: N/A     Social History Main Topics     Smoking status: Never Smoker     Smokeless tobacco: Never Used     Alcohol use None     Drug use: None     Sexual activity: Not Asked     Other Topics Concern     None     Social History Narrative     Patient Active Problem List   Diagnosis     Benign Essential Hypertension     Primary Vitiligo     Vitamin D Deficiency     Iron Deficiency     Anemia     Obesity (BMI 30.0-34.9)                                              SOCIAL: She  reports that she has never smoked. She has never used smokeless tobacco.    REVIEW OF SYSTEMS:   Family history not pertinent to chief complaint or presenting problem    Review of systems otherwise negative as requested from patient, except   Those positive ROS outlined and discussed in [place].    OBJECTIVE:  /88  Pulse 68  SpO2 98%    GENERAL:     No acute distress.   Alert and oriented X 3         Physical:    Cirrhosis bilaterally sclera clear she has no cataracts visualized she has vitiligo changes of her neck arms back  Lungs are clear  Cardiac S1-S2 she is in regular sinus rhythm recheck of her blood pressure 136/88  Lungs are clear with equal symmetric air entry next  Abdomen obese waist hip ratio greater than 1  No lower extremity edema  No tremulous      Left knee she has tenderness to palpation across the medial joint line no appreciable effusion  Tenderness to palpation across the pens anserine bursa  Calves supple  Mild tenderness to palpation insertion of the patellar tendon into the patella  Minimal tenderness to palpation of the patella itself  No Baker's cyst  No lower extremity and or just distal pulses are symmetric        ASSESSMENT & PLAN      Diagnoses and all orders for this visit:    Screen for colon cancer  -     Ambulatory referral for Colonoscopy    Hypertension  -     metoprolol succinate (TOPROL-XL) 50 MG 24 hr tablet; TAKE 1  TABLET BY MOUTH DAILY    Benign Essential Hypertension  -     Thyroid Cascade    Vitamin D deficiency  -     Vitamin D, Total (25-Hydroxy)    Other orders  -     Tdap vaccine greater than or equal to 8yo IM    [place] the day high-protein high-fat low sugar low simple carbohydrate diet limited all added sugar  We talked with 7 minute workout  We talked about cardiovascular exercise and try to work this into her schedule at least 3-4 times a week  She was written for a standup desk today  Tetanus booster today  Referral made for colonoscopy and she wishes to see colorectal surgery  Up-to-date on mammography she had done at [facility]    No Follow-up on file.       Anticipatory Guidance and Symptomatic Cares Discussed   Advised to call back directly if there are further questions, or if these symptoms fail to improve as anticipated or worsen.  Return to clinic if patient has a clinical concern that warrants an exam.         20  Min Total Time, > 50% counseling and coordination of Care    Duglas Padron MD  Family Medicine   [facility] [phone]

## 2021-06-16 PROBLEM — R00.2 PALPITATIONS: Status: ACTIVE | Noted: 2021-01-07

## 2021-06-16 PROBLEM — K76.0 FATTY LIVER: Status: ACTIVE | Noted: 2021-02-27

## 2021-06-16 PROBLEM — E66.01 MORBID OBESITY (H): Status: ACTIVE | Noted: 2021-04-05

## 2021-06-16 NOTE — PROGRESS NOTES
Assessment:   The encounter diagnosis was Acute sinusitis.     Plan:     Medications Ordered   Medications     amoxicillin (AMOXIL) 875 MG tablet     Sig: Take 1 tablet (875 mg total) by mouth 2 (two) times a day for 10 days.     Dispense:  20 tablet     Refill:  0     Patient Instructions     Based on the information that you have provided, I have placed an order for you to start treatment.  View your full visit summary for details. Click on the link below to access your visit summary.    Your pharmacist will address any questions you may have about taking the medication.  If you don't see improvement after 3 days of treatment I would recommend you come in for an appointment to discuss these symptoms. You are able to schedule an appointment within [facility] at your convenience.    If you do need to come in for this same symptom within the next seven days, your eVisit will be free of charge.      Return for further follow up if needed. Call [phone]) or schedule an appointment via [email or web address]    Subjective:   Lacey Kennedy is a 52 y.o. female who submitted an eVisit request for evaluation of her Sinus Problem.  See the questionnaire and message section of encounter report for information related to history of present illness and review of systems.    The following portions of the patient's history were reviewed and updated as appropriate:  She  does not have any pertinent problems on file.  She is allergic to sodium phenylbutyrate..     Objective:   No exam performed today, patient submitted as eVisit.

## 2021-06-16 NOTE — PATIENT INSTRUCTIONS - HE
Always great to see you Lacey    I have made a referral to sleep medicine for possible sleep test    Lets do a thyroid ultrasound    Your cardiac test is super reassuring    For fatty liver most important things stay active decrease the amount of simple sugars shoot for your goal weight of 150    Stay strong with some resistance weight training    Cardiovascular fitness prior  It is one of the best exercises you can do    Accomplish your mammogram  Blood pressure is at goal today    [place]

## 2021-06-16 NOTE — PROGRESS NOTES
ASSESSMENT & PLAN    No problem-specific Assessment & Plan notes found for this encounter.      Lacey was seen today for medication management.    Diagnoses and all orders for this visit:    Encounter for other screening for malignant neoplasm of breast  -     Mammo Screening Bilateral; Future    Benign Essential Hypertension  -     metoprolol succinate (TOPROL-XL) 50 MG 24 hr tablet; Take 1 tablet (50 mg total) by mouth daily.    Morbid obesity (H)    Fatigue, unspecified type  -     Ambulatory referral to Sleep Medicine    Loud snoring  -     Ambulatory referral to Sleep Medicine    Anti-TPO antibodies present 1/2021 + 26.1  -     US Thyroid; Future        Patient Instructions       Always great to see you Lacey    I have made a referral to sleep medicine for possible sleep test    Lets do a thyroid ultrasound    Your cardiac test is super reassuring    For fatty liver most important things stay active decrease the amount of simple sugars shoot for your goal weight of 150    Stay strong with some resistance weight training    Cardiovascular fitness prior  It is one of the best exercises you can do    Blood pressure is at goal today    DanL      No follow-ups on file.       Little interest or pleasure in doing things: Not at all  Feeling down, depressed, or hopeless: Not at all    CHIEF COMPLAINT: Lacey Kennedy had concerns including Medication Management.    [place]: 1.............. SUBJECTIVE:  Lacey Kennedy is a 55 y.o. female had concerns including Medication Management.    1. Encounter for other screening for malignant neoplasm of breast    2. Benign Essential Hypertension    3. Morbid obesity (H)    4. Fatigue, unspecified type    5. Loud snoring    6. Anti-TPO antibodies present 1/2021  + 26.1          Allergies   Allergen Reactions     Sodium Phenylbutyrate                          SOCIAL: She  reports that she has never smoked. She has never used smokeless tobacco.    REVIEW OF  INDICATION: 

Large for gestational age.



TECHNIQUE: 

Multiple Real-time grayscale images were obtained over the gravid

uterus.



COMPARISON: 

02/22/2021.



FINDINGS:

The previous OB ultrasound exam performed on 02/22/2021 noted a

single live fetus of approximately 20 weeks 6 days gestation +/-

1 week. There were no fetal abnormalities identified; however,

the kidneys were suboptimally evaluated due to the patient's body

habitus. 



On this study, the fetus is again evident. The fetus is in

transverse presentation with the fetal head on the maternal left.

Fetal heart motion was noted and a rate of 146 BPM was recorded.

There are no fetal abnormalities noted. The fetal kidneys are

still not optimally visualized but there is no definite fetal

renal abnormality noted.



The placenta is posterior and there is no previa. The amniotic

fluid volume is within normal limits.



By the previous exam, the estimated gestational age is 33 weeks 1

day +/- 1.5 weeks. Since the prior study, the fetal growth

parameters have progressed as expected, however, tending towards

the high side of normal. The estimated fetal weight is at the 

91st percentile.



Biometrical measurements are as follows:

Biparietal 8.30 cm, age 33 weeks 3 days.

Head circumference 30.58 cm, age 34 weeks 1 days.

Abdominal circumference 28.44 cm, age 32 weeks 4 days.

Femur length 6.21 cm, age 32 weeks 2 days.



Sonographic estimate age: 33 weeks 1 days.

Sonographic estimated date of delivery: 06/20/2021.



Estimated Fetal Weight: 2014 gm (+/- 294  gm).

LMP percentile: 91%.



Fetal heart rate: 146 beats per minute.



Fetal number: 1 of 1.



IMPRESSION: 

1. There is a single live fetus of approximately 33 weeks 1 day

gestation +/- 1.5 weeks. The EDC remains 07/06/2021.



2. There were no fetal abnormalities identified although the

fetal kidneys were still not well visualized.



3. The fetal growth parameters have progressed since the prior

exam but tending towards the high side of normal. The estimated

fetal weight is now in the 91st percentile.



Dictated by: 



  Dictated on workstation # [id number] SYSTEMS:   Family history not pertinent to chief complaint or presenting problem    Review of systems otherwise negative as requested from patient, except   Those positive ROS outlined and discussed in [place].      VITALS:  Vitals:    04/05/21 1448 04/05/21 1531   BP: 148/82 128/82   Patient Site: Left Arm    Patient Position: Sitting    Cuff Size: Adult Regular    Pulse: 77    Temp: 98.3  F (36.8  C)    TempSrc: Tympanic    SpO2: 96%    Weight: 203 lb 14.4 oz (92.5 kg)      Wt Readings from Last 3 Encounters:   04/05/21 203 lb 14.4 oz (92.5 kg)   02/23/21 200 lb (90.7 kg)     Body mass index is 35.55 kg/m .    Physical Exam:  Thyroid palpable no appreciable nodules some irregular contour of the left  No tremor  BMI 35  Stop bang testing 5      STOP-BANG Sleep Apnea Questionnaire Hiren EAGLE et al Anesthesiology 2008 and BJA 2012     STOP     Do you SNORE loudly (louder than talking or loud enough to be heard through closed doors)? Yes      Do you often feel TIRED, fatigued, or sleepy during daytime?   Yes     Has anyone OBSERVED you stop breathing during your sleep?    No     Do you have or are you being treated for high blood PRESSURE?   Yes      BANG     BMI more than 35kg/m2?   Yes      AGE over 50 years old?   Yes      NECK circumference > 16 inches (40cm)?    No     GENDER: Male?  No     TOTAL SCORE     High risk of YA: Yes 5 - 8     Intermediate risk of YA: Yes 3 - 4     Low risk of YA: Yes 0 - 2     I spent 30 minutes with this patient.  This includes pre-visit, intra-visit and post visit work an evaluation with regards to Lacey was seen today for medication management.    Diagnoses and all orders for this visit:    Encounter for other screening for malignant neoplasm of breast  -     Mammo Screening Bilateral; Future    Benign Essential Hypertension  -     metoprolol succinate (TOPROL-XL) 50 MG 24 hr tablet; Take 1 tablet (50 mg total) by mouth daily.    Morbid obesity (H)    Fatigue, unspecified type  -      Ambulatory referral to Sleep Medicine    Loud snoring  -     Ambulatory referral to Sleep Medicine    Anti-TPO antibodies present 1/2021  + 26.1  -     US Thyroid; Future        Duglas Padron MD  [facility] [id number] [phone]

## 2021-06-23 NOTE — TELEPHONE ENCOUNTER
Due to be seen     Medication was last renewed on 1/12/18.    Sofy Patel, [facility] Triage/Med Refill 1/12/2019     Requested Prescriptions   Pending Prescriptions Disp Refills     metoprolol succinate (TOPROL-XL) 50 MG 24 hr tablet [Pharmacy Med Name: METOPROLOL ER SUCCINATE 50MG TABS] 90 tablet 0     Sig: TAKE 1 TABLET BY MOUTH DAILY    Beta-Blockers Refill Protocol Passed - 1/11/2019  8:26 AM       Passed - PCP or prescribing provider visit in past 12 months or next 3 months    Last office visit with prescriber/PCP: 1/12/2018 Duglas Padron MD OR same dept: 1/12/2018 Duglas Padron MD OR same specialty: 1/12/2018 Duglas Padron MD  Last physical: Visit date not found Last MTM visit: Visit date not found   Next visit within 3 mo: Visit date not found  Next physical within 3 mo: Visit date not found  Prescriber OR PCP: Duglas Padron MD  Last diagnosis associated with med order: 1. Hypertension  - metoprolol succinate (TOPROL-XL) 50 MG 24 hr tablet [Pharmacy Med Name: METOPROLOL ER SUCCINATE 50MG TABS]; TAKE 1 TABLET BY MOUTH DAILY  Dispense: 90 tablet; Refill: 0    If protocol passes may refill for 12 months if within 3 months of last provider visit (or a total of 15 months).            Passed - Blood pressure filed in past 12 months    BP Readings from Last 1 Encounters:   01/12/18 136/88

## 2021-06-30 ENCOUNTER — COMMUNICATION - HEALTHEAST (OUTPATIENT)
Dept: INTERNAL MEDICINE | Facility: CLINIC | Age: 56
End: 2021-06-30

## 2021-07-04 NOTE — TELEPHONE ENCOUNTER
Telephone Encounter by Denisse Velasquez at 6/30/2021  9:45 AM     Author: Denisse Velasquez Service: -- Author Type: Licensed Nurse    Filed: 6/30/2021  9:46 AM Encounter Date: 6/25/2021 Status: Signed    : Denisse Velasquez (Licensed Nurse)       [place] [facility]

## 2021-08-21 ENCOUNTER — HEALTH MAINTENANCE LETTER (OUTPATIENT)
Age: 56
End: 2021-08-21

## 2021-10-06 ENCOUNTER — TELEPHONE (OUTPATIENT)
Dept: FAMILY MEDICINE | Facility: CLINIC | Age: 56
End: 2021-10-06

## 2021-10-06 DIAGNOSIS — I10 ESSENTIAL HYPERTENSION, BENIGN: ICD-10-CM

## 2021-10-06 NOTE — TELEPHONE ENCOUNTER
Reason for Call:  Medication or medication refill:    Do you use a [facility]?  Name of the pharmacy and phone number for the current request:  [facility] in [facility]-updated pharm    Name of the medication requested: metoprolol succinate (TOPROL-XL) 50 MG 24 hr tablet; Take 1 tablet (50 mg total) by mouth daily.    Other request: pt states pharm has been trying to reach us and no response    Can we leave a detailed message on this number? YES    Phone number patient can be reached at: Home number on file [phone] (home)    Best Time: any    Call taken on 10/6/2021 at 4:55 PM by Pam J. Behr

## 2021-10-07 NOTE — TELEPHONE ENCOUNTER
Pending Prescriptions:                       Disp   Refills    metoprolol succinate ER (TOPROL-XL) 50 MG*90 tab*3            Sig: Take 1 tablet (50 mg) by mouth daily    Last office visit was 4/5/21 with Dr. Padron. Blood pressure this day was 148/82.

## 2021-10-08 RX ORDER — METOPROLOL SUCCINATE 50 MG/1
50 TABLET, EXTENDED RELEASE ORAL DAILY
Qty: 90 TABLET | Refills: 3 | Status: SHIPPED | OUTPATIENT
Start: 2021-10-08 | End: 2022-09-23

## 2021-10-16 ENCOUNTER — HEALTH MAINTENANCE LETTER (OUTPATIENT)
Age: 56
End: 2021-10-16

## 2021-10-18 ENCOUNTER — ALLIED HEALTH/NURSE VISIT (OUTPATIENT)
Dept: FAMILY MEDICINE | Facility: CLINIC | Age: 56
End: 2021-10-18
Payer: COMMERCIAL

## 2021-10-18 DIAGNOSIS — Z23 NEED FOR VACCINATION: Primary | ICD-10-CM

## 2021-10-18 PROCEDURE — 90471 IMMUNIZATION ADMIN: CPT

## 2021-10-18 PROCEDURE — 90682 RIV4 VACC RECOMBINANT DNA IM: CPT

## 2021-10-18 PROCEDURE — 99207 PR NO CHARGE NURSE ONLY: CPT

## 2022-02-17 PROBLEM — R76.8 ANTI-TPO ANTIBODIES PRESENT: Status: ACTIVE | Noted: 2021-01-16

## 2022-10-01 ENCOUNTER — HEALTH MAINTENANCE LETTER (OUTPATIENT)
Age: 57
End: 2022-10-01

## 2022-12-21 DIAGNOSIS — I10 ESSENTIAL HYPERTENSION, BENIGN: ICD-10-CM

## 2022-12-22 RX ORDER — METOPROLOL SUCCINATE 50 MG/1
TABLET, EXTENDED RELEASE ORAL
Qty: 30 TABLET | Refills: 0 | Status: SHIPPED | OUTPATIENT
Start: 2022-12-22 | End: 2023-01-20

## 2022-12-22 NOTE — TELEPHONE ENCOUNTER
"Routing refill request to provider for review/approval because:  Patient needs to be seen because it has been more than 1 year since last office visit.  Blood pressures    Last Written Prescription Date:  9/23/2022  Last Fill Quantity: 90,  # refills: 0   Last office visit provider:  4/5/2021     Requested Prescriptions   Pending Prescriptions Disp Refills     metoprolol succinate ER (TOPROL XL) 50 MG 24 hr tablet [Pharmacy Med Name: METOPROLOL ER SUCCINATE 50MG TABS] 90 tablet 0     Sig: TAKE 1 TABLET(50 MG) BY MOUTH DAILY       Beta-Blockers Protocol Failed - 12/21/2022  3:12 AM        Failed - Blood pressure under 140/90 in past 12 months     BP Readings from Last 3 Encounters:   04/05/21 128/82   01/07/21 (!) 146/92   10/04/19 132/80                 Failed - Recent (12 mo) or future (30 days) visit within the authorizing provider's specialty     Patient has had an office visit with the authorizing provider or a provider within the authorizing providers department within the previous 12 mos or has a future within next 30 days. See \"Patient Info\" tab in inbasket, or \"Choose Columns\" in Meds & Orders section of the refill encounter.              Passed - Patient is age 6 or older        Passed - Medication is active on med list             Angelica Sharp RN 12/21/22 11:52 PM  "

## 2022-12-22 NOTE — TELEPHONE ENCOUNTER
Patient was to establish care but provider was out of office unexpectedly. Has appointment 1/20/23    BP Readings from Last 6 Encounters:   04/05/21 128/82   01/07/21 (!) 146/92   10/04/19 132/80         Lucia Rahman,      Unfortunately, Angelica Harvey is unavailable unexpectedly today, 11/1/2022 and we need to reschedule your appointment.  We do apologize for the inconvenience.  If you would please call our scheduling line at [phone] we will be happy to help, or you may also reschedule via [facility].  Thank you for your understanding.

## 2023-01-03 ENCOUNTER — ANCILLARY PROCEDURE (OUTPATIENT)
Dept: GENERAL RADIOLOGY | Facility: CLINIC | Age: 58
End: 2023-01-03
Attending: PHYSICIAN ASSISTANT
Payer: COMMERCIAL

## 2023-01-03 ENCOUNTER — OFFICE VISIT (OUTPATIENT)
Dept: FAMILY MEDICINE | Facility: CLINIC | Age: 58
End: 2023-01-03
Payer: COMMERCIAL

## 2023-01-03 VITALS
OXYGEN SATURATION: 95 % | DIASTOLIC BLOOD PRESSURE: 104 MMHG | TEMPERATURE: 98.1 F | SYSTOLIC BLOOD PRESSURE: 152 MMHG | HEART RATE: 73 BPM | RESPIRATION RATE: 14 BRPM

## 2023-01-03 DIAGNOSIS — M25.562 ACUTE PAIN OF LEFT KNEE: Primary | ICD-10-CM

## 2023-01-03 PROCEDURE — 73562 X-RAY EXAM OF KNEE 3: CPT | Mod: TC | Performed by: RADIOLOGY

## 2023-01-03 PROCEDURE — 73590 X-RAY EXAM OF LOWER LEG: CPT | Mod: TC | Performed by: RADIOLOGY

## 2023-01-03 PROCEDURE — 99213 OFFICE O/P EST LOW 20 MIN: CPT | Performed by: PHYSICIAN ASSISTANT

## 2023-01-03 NOTE — PROGRESS NOTES
Assessment & Plan:      Problem List Items Addressed This Visit    None  Visit Diagnoses     Acute pain of left knee    -  Primary    Relevant Orders    XR Knee Left 3 Views (Completed)    XR Tibia and Fibula Left 2 Views (Completed)        Medical Decision Making  Patient presents with pain in the left knee and shin following a fall that occurred yesterday.  X-rays negative for acute fracture per my interpretation.  Discussed treatment and symptomatic care.  Allergies and medication interactions reviewed.  Discussed signs of worsening symptoms and when to follow-up with PCP if no symptom improvement.     Subjective:      Lacey Peña is a 57 year old female here for evaluation of a fall onto the left leg.  Event of injury occurred last night.  Patient slipped on ice and fell to the ground.  She struck the ground just below her knee into the anterior shin.  Patient had instant pain at that time.  Pains are worsened since this morning.  Patient notes bruising and swelling across the anterior shin.  Pain is worse with weightbearing.  Also noting some pain with movements in the left lower back.     The following portions of the patient's history were reviewed and updated as appropriate: allergies, current medications, and problem list.     Review of Systems  Pertinent items are noted in HPI.    Allergies  Allergies   Allergen Reactions     Sodium Phenylbutyrate [Phenylbutyric Acid] Unknown       No family history on file.    Social History     Tobacco Use     Smoking status: Never     Smokeless tobacco: Never   Substance Use Topics     Alcohol use: Not on file        Objective:      BP (!) 152/104   Pulse 73   Temp 98.1  F (36.7  C) (Oral)   Resp 14   SpO2 95%   General appearance - alert, well appearing, and in no distress and non-toxic  Extremities - Left lower extremity: Tenderness to palpation along the anterior knee and proximal tibia  Skin - Left lower extremity: Mild ecchymosis along the  proximal shin, skin intact, mild swelling, skin is normal warmth to touch     Lab & Imaging Results    Results for orders placed or performed in visit on 01/03/23   XR Knee Left 3 Views     Status: None (Preliminary result)    Narrative    EXAM: XR KNEE LEFT 3 VIEWS  LOCATION: [facility]  DATE/TIME: 1/3/2023 5:22 PM    INDICATION: Pain and tenderness over the left anterior knee and proximal tibia after a fall; rule out acute fracture.  COMPARISON: None.      Impression    IMPRESSION: Minimal tricompartmental osteoarthrosis. No fracture, effusion or calcified intra-articular body.   XR Tibia and Fibula Left 2 Views     Status: None    Narrative    EXAM: XR TIBIA AND FIBULA LEFT 2 VIEWS  LOCATION: [facility]  DATE/TIME: 1/3/2023 5:22 PM    INDICATION: Pain and tenderness over the left anterior knee and proximal tibia after a fall; rule out acute fracture  COMPARISON: None.      Impression    IMPRESSION: Normal tibia and fibula.       I personally reviewed these results and discussed findings with the patient.    The use of Dragon/Buyt.In dictation services was used to construct the content of this note; any grammatical errors are non-intentional. Please contact the author directly if you are in need of any clarification.

## 2023-01-03 NOTE — PATIENT INSTRUCTIONS
You were seen today for a(n) knee and leg injury. The x-ray showed no signs of a bone fracture.    Symptom management:  - Rest the injured site  - Ice pads applied 10-15 minutes up to every hour as needed  - Compression with light bandages or brace  - Elevation of the affected area above the chest  - May use ibuprofen to help with swelling and discomfort    If no improvement in symptoms in 1 week, recommend follow-up with your primary care provider for further evaluation.    Reasons to return sooner for re-evaluation:  - Numbness or tingling develops around the site of injury  - Develop severe or worsening pain  - Area becomes blue or cold to the touch

## 2023-01-19 DIAGNOSIS — I10 ESSENTIAL HYPERTENSION, BENIGN: ICD-10-CM

## 2023-01-20 ENCOUNTER — OFFICE VISIT (OUTPATIENT)
Dept: FAMILY MEDICINE | Facility: CLINIC | Age: 58
End: 2023-01-20
Payer: COMMERCIAL

## 2023-01-20 VITALS
DIASTOLIC BLOOD PRESSURE: 80 MMHG | WEIGHT: 201 LBS | SYSTOLIC BLOOD PRESSURE: 140 MMHG | HEIGHT: 64 IN | RESPIRATION RATE: 12 BRPM | BODY MASS INDEX: 34.31 KG/M2 | HEART RATE: 68 BPM | OXYGEN SATURATION: 97 % | TEMPERATURE: 98.1 F

## 2023-01-20 DIAGNOSIS — L80 VITILIGO: ICD-10-CM

## 2023-01-20 DIAGNOSIS — Z12.11 SCREEN FOR COLON CANCER: ICD-10-CM

## 2023-01-20 DIAGNOSIS — E66.01 MORBID OBESITY (H): ICD-10-CM

## 2023-01-20 DIAGNOSIS — E55.9 VITAMIN D DEFICIENCY: ICD-10-CM

## 2023-01-20 DIAGNOSIS — L65.9 HAIR THINNING: ICD-10-CM

## 2023-01-20 DIAGNOSIS — I10 ESSENTIAL HYPERTENSION, BENIGN: ICD-10-CM

## 2023-01-20 DIAGNOSIS — R53.83 FATIGUE, UNSPECIFIED TYPE: ICD-10-CM

## 2023-01-20 DIAGNOSIS — R53.83 LACK OF ENERGY: ICD-10-CM

## 2023-01-20 DIAGNOSIS — R76.8 ANTI-TPO ANTIBODIES PRESENT: Primary | ICD-10-CM

## 2023-01-20 PROCEDURE — 99213 OFFICE O/P EST LOW 20 MIN: CPT | Performed by: NURSE PRACTITIONER

## 2023-01-20 RX ORDER — METOPROLOL SUCCINATE 50 MG/1
50 TABLET, EXTENDED RELEASE ORAL DAILY
Qty: 90 TABLET | Refills: 4 | Status: SHIPPED | OUTPATIENT
Start: 2023-01-20

## 2023-01-20 RX ORDER — METOPROLOL SUCCINATE 50 MG/1
TABLET, EXTENDED RELEASE ORAL
Qty: 30 TABLET | Refills: 0 | Status: SHIPPED | OUTPATIENT
Start: 2023-01-20 | End: 2023-01-20

## 2023-01-20 NOTE — PROGRESS NOTES
"  Assessment & Plan     Screen for colon cancer    - Colonoscopy Screening  Referral; Future    Essential hypertension, benign  - BP today higher but has been normal range last year. I discussed this with her today. We decided to continue to monitor it for now.   - metoprolol succinate ER (TOPROL XL) 50 MG 24 hr tablet; Take 1 tablet (50 mg) by mouth daily  - Lipid panel reflex to direct LDL Fasting; Future    Obesity (BMI 35.0-39.9) with comorbidity (H)    - Adult Endocrinology  Referral; Future    Primary Vitiligo      Anti-TPO antibodies present 1/2021  + 26.1  - she is requesting to see Endo related to her Thyroid, symptoms, and autoimmune disease. She would like more clarity.   - Adult Endocrinology  Referral; Future    Hair thinning    - Adult Endocrinology  Referral; Future    Lack of energy    - Adult Endocrinology  Referral; Future    Vitamin D deficiency  - vitamin D stable at last check. She will continue supplements.     Fatigue, unspecified type  - I reviewed prior labs, sleep apnea, diet, and exercise with her. She appears very active and eating healthier. Her fatigue appears chronic and we will continue to monitor.                BMI:   Estimated body mass index is 35.05 kg/m  as calculated from the following:    Height as of this encounter: 1.613 m (5' 3.5\").    Weight as of this encounter: 91.2 kg (201 lb).   Weight management plan: Patient referred to endocrine and/or weight management specialty Discussed healthy diet and exercise guidelines        Return in about 1 year (around 1/20/2024) for Routine preventive, with me.    SON Pratt CNP  [facility]   Lacey is a 57 year old, presenting for the following health issues:  Establish Care, Recheck Medication, Refill Request, and Consult (Talk about future health appointments)    - for many years, she has noticed fluctuations in what she believes is " "possibly her hashimoto disease. She stated she has difficulty losing weight, lack of energy, fatigue, and hair thinning. She has an autoimmune disease Vitilego. She snores, but tested negative for apnea. She stated she is a light sleeper.   - she stated she is very active.     History of Present Illness       Reason for visit:  Establish care    She eats 0-1 servings of fruits and vegetables daily.She consumes 1 sweetened beverage(s) daily.She exercises with enough effort to increase her heart rate 10 to 19 minutes per day.  She exercises with enough effort to increase her heart rate 3 or less days per week.   She is taking medications regularly.             Review of Systems   CONSTITUTIONAL: NEGATIVE for fever, chills, change in weight  ENT/MOUTH: NEGATIVE for ear, mouth and throat problems  RESP: NEGATIVE for significant cough or SOB  CV: NEGATIVE for chest pain, palpitations or peripheral edema      Objective    BP (!) 140/80   Pulse 68   Temp 98.1  F (36.7  C) (Oral)   Resp 12   Ht 1.613 m (5' 3.5\")   Wt 91.2 kg (201 lb)   SpO2 97%   BMI 35.05 kg/m    Body mass index is 35.05 kg/m .  Physical Exam   GENERAL: healthy, alert and no distress  MS: no gross musculoskeletal defects noted, no edema  SKIN: no suspicious lesions or rashes  PSYCH: mentation appears normal, affect normal/bright    Office Visit - [facility] on 01/07/2021   Component Date Value Ref Range Status     Free T4 01/07/2021 1.0  0.7 - 1.8 ng/dL Final     T4, Total 01/07/2021 8.5  4.5 - 13.0 ug/dL Final     Thyroid Peroxidase Ab 01/07/2021 26.1 (H)  0.0 - 5.6 IU/mL Final     TSH 01/07/2021 2.55  0.30 - 5.00 uIU/mL Final     Thyroid Stimulating Immunoglobulin* 01/07/2021 <0.10  <=0.54 IU/L Final    Comment: INTERPRETIVE INFORMATION: Thyroid Stimulating Immunoglobulin                            (TSI)  0.54 IU/L or less.........Consistent with healthy thyroid function   or non-Graves thyroid or autoimmune disease. Those with healthy   thyroid " function typically have results less than 0.1 IU/L.    0.55 IU/L or greater......Consistent with Graves disease   (autoimmune hyperthyroidism)    This assay specifically detects thyroid stimulating   autoantibodies. For diagnostic purposes, the results obtained from   this assay should be used in combination with clinical   examination, patient medical history, and other findings.  Performed By: [facility]  [address] [place] [id number]  : Elsa Liu MD       Vitamin D, Total (25-Hydroxy) 01/07/2021 45.5  30.0 - 80.0 ng/mL Final     Sodium 01/07/2021 141  136 - 145 mmol/L Final     Potassium 01/07/2021 4.2  3.5 - 5.0 mmol/L Final     Chloride 01/07/2021 104  98 - 107 mmol/L Final     Carbon Dioxide (CO2) 01/07/2021 28  22 - 31 mmol/L Final     Anion Gap 01/07/2021 9  5 - 18 mmol/L Final     Glucose 01/07/2021 85  70 - 125 mg/dL Final     Urea Nitrogen 01/07/2021 10  8 - 22 mg/dL Final     Creatinine 01/07/2021 0.75  0.60 - 1.10 mg/dL Final     GFR Estimate If Black 01/07/2021 >60  >60 mL/min/1.73m2 Final     GFR Estimate 01/07/2021 >60  >60 mL/min/1.73m2 Final     Bilirubin Total 01/07/2021 0.7  0.0 - 1.0 mg/dL Final     Calcium 01/07/2021 9.5  8.5 - 10.5 mg/dL Final     Protein Total 01/07/2021 6.9  6.0 - 8.0 g/dL Final     Albumin 01/07/2021 4.2  3.5 - 5.0 g/dL Final     Alkaline Phosphatase 01/07/2021 51  45 - 120 U/L Final     AST 01/07/2021 18  0 - 40 U/L Final     ALT 01/07/2021 23  0 - 45 U/L Final     WBC 01/07/2021 6.9  4.0 - 11.0 thou/uL Final     RBC Count 01/07/2021 5.03  3.80 - 5.40 mill/uL Final     Hemoglobin 01/07/2021 14.5  12.0 - 16.0 g/dL Final     Hematocrit 01/07/2021 41.5  35.0 - 47.0 % Final     MCV 01/07/2021 82  80 - 100 fL Final     MCH 01/07/2021 28.8  27.0 - 34.0 pg Final     MCHC 01/07/2021 35.0  32.0 - 36.0 g/dL Final     RDW 01/07/2021 12.4  11.0 - 14.5 % Final     Platelet Count 01/07/2021 200  140 - 440 thou/uL Final     Mean Platelet  Volume 01/07/2021 7.8  7.0 - 10.0 fL Final     % Neutrophils 01/07/2021 60  50 - 70 % Final     % Lymphocytes 01/07/2021 30  20 - 40 % Final     % Monocytes 01/07/2021 7  2 - 10 % Final     % Eosinophils 01/07/2021 2  0 - 6 % Final     % Basophils 01/07/2021 1  0 - 2 % Final     Absolute Neutrophils 01/07/2021 4.1  2.0 - 7.7 thou/uL Final     Absolute Lymphocytes 01/07/2021 2.1  0.8 - 4.4 thou/uL Final     Absolute Monocytes 01/07/2021 0.5  0.0 - 0.9 thou/uL Final     Eosinophils Absolute 01/07/2021 0.2  0.0 - 0.4 thou/uL Final     Absolute Basophils 01/07/2021 0.0  0.0 - 0.2 thou/uL Final     Cholesterol 01/07/2021 207 (H)  <=199 mg/dL Final     Triglycerides 01/07/2021 106  <=149 mg/dL Final     Direct Measure HDL 01/07/2021 58  >=50 mg/dL Final     LDL Cholesterol Calculated 01/07/2021 128  <=129 mg/dL Final     Patient Fasting > 8hrs? 01/07/2021 Yes   Final     Ventricular Rate 01/07/2021 65  BPM Final     Atrial Rate 01/07/2021 65  BPM Final     WV Interval 01/07/2021 142  ms Final     QRS Duration 01/07/2021 70  ms Final     QT 01/07/2021 418  ms Final     QTc 01/07/2021 434  ms Final     P Axis 01/07/2021 36  degrees Final     R AXIS 01/07/2021 51  degrees Final     T Axis 01/07/2021 50  degrees Final     Interpretation ECG 01/07/2021    Final                    Value:Normal sinus rhythm  Normal ECG  No previous ECGs available  Confirmed by RAOUL LOYD MD LOC:WW ([id number]) on 1/8/2021 10:12:26 AM       Vitamin D, Total (25-Hydroxy) 01/07/2021 45.5  30.0 - 80.0 ng/mL Final

## 2023-01-20 NOTE — TELEPHONE ENCOUNTER
"Former patient of [facility] & has not established care with another provider.  Please assign refill request to covering provider per clinic standard process.      Routing refill request to provider for review/approval because:  Patient needs to be seen because it has been more than 1 year since last office visit.    Last Written Prescription Date:  12/22/22  Last Fill Quantity: 30,  # refills: 0   Last office visit provider:  4/5/21     Requested Prescriptions   Pending Prescriptions Disp Refills     metoprolol succinate ER (TOPROL XL) 50 MG 24 hr tablet [Pharmacy Med Name: METOPROLOL ER SUCCINATE 50MG TABS] 30 tablet 0     Sig: TAKE 1 TABLET(50 MG) BY MOUTH DAILY       Beta-Blockers Protocol Failed - 1/19/2023  3:14 AM        Failed - Blood pressure under 140/90 in past 12 months     BP Readings from Last 3 Encounters:   01/03/23 (!) 152/104   04/05/21 128/82   01/07/21 (!) 146/92                 Failed - Recent (12 mo) or future (30 days) visit within the authorizing provider's specialty     Patient has had an office visit with the authorizing provider or a provider within the authorizing providers department within the previous 12 mos or has a future within next 30 days. See \"Patient Info\" tab in inbasket, or \"Choose Columns\" in Meds & Orders section of the refill encounter.              Passed - Patient is age 6 or older        Passed - Medication is active on med list             Sofy Patel RN 01/20/23 9:58 AM  "

## 2023-02-22 DIAGNOSIS — I10 ESSENTIAL HYPERTENSION, BENIGN: ICD-10-CM

## 2023-02-23 RX ORDER — METOPROLOL SUCCINATE 50 MG/1
50 TABLET, EXTENDED RELEASE ORAL DAILY
Qty: 90 TABLET | Refills: 4 | OUTPATIENT
Start: 2023-02-23

## 2023-02-23 NOTE — TELEPHONE ENCOUNTER
"Metoprolol refilled on 1/20/23 for a QTY of 90 with 4 refills      Routing refill request to provider for review/approval because:  BP out of range    Last Written Prescription Date:  1/20/23  Last Fill Quantity: 90,  # refills: 4   Last office visit provider:  1/20/23     Requested Prescriptions   Pending Prescriptions Disp Refills     metoprolol succinate ER (TOPROL XL) 50 MG 24 hr tablet 90 tablet 4     Sig: Take 1 tablet (50 mg) by mouth daily       Beta-Blockers Protocol Failed - 2/22/2023  9:34 AM        Failed - Blood pressure under 140/90 in past 12 months     BP Readings from Last 3 Encounters:   01/20/23 (!) 140/80   01/03/23 (!) 152/104   04/05/21 128/82                 Passed - Patient is age 6 or older        Passed - Recent (12 mo) or future (30 days) visit within the authorizing provider's specialty     Patient has had an office visit with the authorizing provider or a provider within the authorizing providers department within the previous 12 mos or has a future within next 30 days. See \"Patient Info\" tab in inbasket, or \"Choose Columns\" in Meds & Orders section of the refill encounter.              Passed - Medication is active on med list             Angelica Webber RN 02/23/23 2:29 PM  "

## 2023-07-26 ENCOUNTER — OFFICE VISIT (OUTPATIENT)
Dept: ENDOCRINOLOGY | Facility: CLINIC | Age: 58
End: 2023-07-26
Payer: COMMERCIAL

## 2023-07-26 VITALS
OXYGEN SATURATION: 99 % | BODY MASS INDEX: 34.42 KG/M2 | SYSTOLIC BLOOD PRESSURE: 152 MMHG | HEART RATE: 69 BPM | HEIGHT: 64 IN | TEMPERATURE: 99.3 F | RESPIRATION RATE: 16 BRPM | DIASTOLIC BLOOD PRESSURE: 96 MMHG | WEIGHT: 201.6 LBS

## 2023-07-26 DIAGNOSIS — E66.01 MORBID OBESITY (H): ICD-10-CM

## 2023-07-26 DIAGNOSIS — R76.8 ANTI-TPO ANTIBODIES PRESENT: ICD-10-CM

## 2023-07-26 DIAGNOSIS — L65.9 HAIR THINNING: ICD-10-CM

## 2023-07-26 PROBLEM — R53.83 LACK OF ENERGY: Status: ACTIVE | Noted: 2023-07-26

## 2023-07-26 PROCEDURE — 99204 OFFICE O/P NEW MOD 45 MIN: CPT | Performed by: INTERNAL MEDICINE

## 2023-07-26 RX ORDER — LANOLIN ALCOHOL/MO/W.PET/CERES
1000 CREAM (GRAM) TOPICAL DAILY
COMMUNITY

## 2023-07-26 RX ORDER — L.ACID,FERM,PLA,RHA/B.BIF,LONG 126 MG
TABLET, DELAYED AND EXTENDED RELEASE ORAL
COMMUNITY

## 2023-07-26 NOTE — LETTER
7/26/2023         RE: Lacey Peña  [address] [place] [id number]        Dear Colleague,    Thank you for referring your patient, Lacey Peña, to the [facility]. Please see a copy of my visit note below.    Name: Lacey Peañ  Seen in consultation with Angelica Harvey for obesity, +TPO ab, hair thinning and lack of energy.  HPI:  Lacey Peña is a 57 year old female who presents for the evaluation of above.  Body mass index is 35.15 kg/m .  Wt Readings from Last 2 Encounters:   07/26/23 91.4 kg (201 lb 9.6 oz)   01/20/23 91.2 kg (201 lb)     Weight gain started: in early 50s  Highest weight as an adult: 201  Lowest weight as an adult: 170 lbs ( 3-4 years back)    Not able to loose weight.    Diets tried: trying to eliminate pasta, bread.    Gastric bypass history: no    Hypothyroidism:  No. H/o hyperthyroidism in 2000 and was on PTU for 18 months. No longer on thyroid medication.  Sister had thyroid cancer?  Pt had normal thyroid ultrasound.in 2021.  + TPO antibodies.  Other labs are in normal range.  Currently she is not on thyroid hormone replacement.    Use of Steroids:No  Family history of Obesity:No  Diet: currently pt is working on-- trying to limit carbs  Exercise: walking and stairs. Part time job of cleaning.  Menses: s/p menopause.  Diarrhea/Constipation:No  Changes in Hair or Skin:Yes: hair loss  Diabetes:No  Sleep Apnea/Snores:No  Hypertension:Yes: on metoprolol since 2001  Hyperlipidemia:No  Hirsutism:No  Stretch marks: no change  PMH/PSH:  HTN  V[place]o  Family Hx:  Family History   Problem Relation Age of Onset     Myocardial Infarction Sister      Social Hx:  Social History     Socioeconomic History     Marital status:      Spouse name: Not on file     Number of children: Not on file     Years of education: Not on file     Highest education level: Not on file   Occupational History     Not on  "file   Tobacco Use     Smoking status: Never     Smokeless tobacco: Never   Substance and Sexual Activity     Alcohol use: Not on file     Drug use: Not on file     Sexual activity: Not on file   Other Topics Concern     Not on file   Social History Narrative     Not on file     Social Determinants of Health     Financial Resource Strain: Not on file   Food Insecurity: Not on file   Transportation Needs: Not on file   Physical Activity: Not on file   Stress: Not on file   Social Connections: Not on file   Intimate Partner Violence: Not on file   Housing Stability: Not on file          MEDICATIONS:  has a current medication list which includes the following prescription(s): cholecalciferol, cyanocobalamin, metoprolol succinate er, clovgran, and probiotic.    ROS     ROS: 10 point ROS neg other than the symptoms noted above in the HPI.    Physical Exam   VS: BP (!) 152/96 (BP Location: Left arm, Patient Position: Chair, Cuff Size: Adult Regular)   Pulse 69   Temp 99.3  F (37.4  C) (Tympanic)   Resp 16   Ht 1.613 m (5' 3.5\")   Wt 91.4 kg (201 lb 9.6 oz)   LMP  (LMP Unknown)   SpO2 99%   Breastfeeding No   BMI 35.15 kg/m    GENERAL: AXOX3, NAD, well dressed, answering questions appropriately, appears stated age.  HEENT: No exopthalmous, no proptosis, EOMI, no lig lag, no retraction  NECK: Thyroid normal in size, non tender, no nodules were palpated.  CV: RRR, no rubs, gallops, no murmurs  LUNGS: CTAB, no wheezes, rales, or ronchi  ABDOMEN: +BS  EXTREMITIES: no edema, +pulses, no rashes, no lesions  NEUROLOGY: CN grossly intact, + DTR upper and lower extremity, no tremors  MSK: grossly intact  SKIN: no rashes, no lesions    LABS:  TFTs:  Component      Latest Ref [facility] 1/7/2021  6:35 PM   Free T4      0.7 - 1.8 ng/dL 1.0    T4 Total      4.5 - 13.0 ug/dL 8.5    Thyroid Peroxidase Ab      0.0 - 5.6 IU/mL 26.1 (H)    TSH      0.30 - 5.00 uIU/mL 2.55    Thyroid Stimulating Immunoglobulin-TSI      <=0.54 IU/L <0.10  "      Legend:  (H) High      All pertinent notes, labs, and images personally reviewed by me.     A/P  Ms.Sheila TAD Peña is a 57 year old here for the evaluation of obestiy:    1.+ TPO antibodies:  History of hyperthyroidism many years back treated with PTU in 2001 for 18 months.  Noted to have positive TPO antibodies but other thyroid hormone levels are in normal range.  She is not on thyroid hormone replacement.  Plan:  Discussed diagnosis, pathophysiology, management and treatment options of condition with pt.  Discussed Hashimoto diagnosis with patient.  As long as thyroid hormone levels are in range, thyroid hormone replacement is not indicated.  Recommend close follow-up and repeat labs annually with primary care provider in the setting of positive TPO antibodies.    Discussed s/s of hypothyroidism and hyperthyroidism to watch for.  The patient indicates understanding of these issues and agrees with the plan.    2. Obesity:  Body mass index is 35.15 kg/m .  Obesity is associated with a significant increase in mortality and risk of many disorders, including diabetes mellitus, hypertension, dyslipidemia, heart disease, stroke, sleep apnea, cancer, and many others. Conversely, weight loss is associated with a reduction in obesity-associated morbidity.  Endocrine evaluation of obesity includes Cushing's and thyroid dysfunction.  Will obtain TSH and freeT4 along with 24 hour urine collection.   Normal thyroid labs.  Plan:  Discussed diagnosis, pathophysiology, management and treatment options of condition with pt.    Recommend to get morning cortisol levels.  Further work-up based on that.  If cortisol levels are normal then if patient is interested can consider weight loss medication.  Lacey to follow up with Primary Care provider regarding elevated blood pressure.    Discussed:  I informed the pt that:  1.Weight loss medications can be taken to assist with weight reduction when combined with appropriate  healthy lifestyle changes.  2.I discussed possible s/e, risks and benefits of weight loss medications.  3.All medications are FDA approved, however, some may be used ''off label'' for their weight loss benefitIs and some ''short term'' medications can be used for longer periods to achieve desired clinical outcomes.  4.All patients taking weight loss medications must be seen in clinic for refill authorization.  Risks of obestiy discussed. Encourage healthy diet. Limit snacks, fluid calories, portions. Limit TV/computer time to one hour a day. Encourage physical activity. Recheck in six months or sooner with concerns.    Obesity is a biological preventable and treatable disease, which is associated with significantly increased risk of many acute and chronic health conditions. Obesity has now been recognized as a chronic disease by the American Medical Association.    A range of serious co-morbidities are associated with obesity including increased risk for hypertension, stroke, coronary artery disease, dyslipidemia, Type II diabetes, depression, sleep apnea, cancers of the colon, breast and endometrium, osteoarthritis and female infertility. Therefore, obesity is not just a problem; it s a disease that warrants serious evidence based treatements.    I explained to the patient relevant work up for the diagnosis and management of obesity and discussed treatment options. Body Mass Index (BMI) has been a standard means for calculating risk for overweight and obesity. The new American Association of Clinical Endocrinology (AACE) algorithm recommends lifestyle modifications as the initial phase of treatment for all patients with the BMI equal or greater than 25 kg/m2. Lifestyle modifications includes use of medical nutrition therapy, exercise, tobacco cessation, adequate quality and quantity of sleep, limited consumption of alcohol and reduced stress through implementation of a structured, multidisciplinary program.    In  patients with complications associated with obesity, graded interventions are recommended including pharmacological therapy such as phentermine, orlistat, lorcaserin and phentermine/topiramate ER, contrave ( buproprion/naltreone) and the use of very low calorie meal replacement programs.    If medical intervention is insufficient, surgical therapy may be considered, especially in patients with BMI greater than or equal to 35 kg/m2 with multiple complications. Surgical treatments include lap-band, gastric sleeve or gastric bypass surgery.      More than 50% of the time spent with Ms. Charline Peña on counseling / coordinating her care.        Follow-up:  As noted in AVS.    Dai Gold MD  Endocrinology   [facility]    CC: Angelica Harvey, thank you for allowing me to participate in the care of your patient.        Sincerely,        Dai Gold MD

## 2023-07-26 NOTE — PATIENT INSTRUCTIONS
[facility]  Dr Gold, Endocrinology Department    [place] [address] [id number]  [place] [address]  Appointment [phone]  Fax: [phone]  [place]: Monday - Thursday      Please check the cost coverage and copay with insurance before recommended tests, services and medications (especially if new medications are prescribed).     If ordered, please get blood work done 1 week prior to your next appointment so they will be available to Dr. Gold at your visit.    Thyroid labs are in acceptable range.  No thyroid hormone replacement is needed.  Recommend annual labs with PCP.    Need morning labs to check cortisol.

## 2023-08-06 ENCOUNTER — HEALTH MAINTENANCE LETTER (OUTPATIENT)
Age: 58
End: 2023-08-06

## 2023-10-15 ENCOUNTER — HEALTH MAINTENANCE LETTER (OUTPATIENT)
Age: 58
End: 2023-10-15

## 2023-11-10 ENCOUNTER — LAB REQUISITION (OUTPATIENT)
Dept: LAB | Facility: CLINIC | Age: 58
End: 2023-11-10

## 2023-11-10 DIAGNOSIS — Z01.419 ENCOUNTER FOR GYNECOLOGICAL EXAMINATION (GENERAL) (ROUTINE) WITHOUT ABNORMAL FINDINGS: ICD-10-CM

## 2023-11-10 PROCEDURE — G0145 SCR C/V CYTO,THINLAYER,RESCR: HCPCS | Performed by: FAMILY MEDICINE

## 2023-11-10 PROCEDURE — 87624 HPV HI-RISK TYP POOLED RSLT: CPT | Performed by: FAMILY MEDICINE

## 2023-11-13 ENCOUNTER — LAB REQUISITION (OUTPATIENT)
Dept: LAB | Facility: CLINIC | Age: 58
End: 2023-11-13

## 2023-11-13 DIAGNOSIS — Z13.6 ENCOUNTER FOR SCREENING FOR CARDIOVASCULAR DISORDERS: ICD-10-CM

## 2023-11-13 DIAGNOSIS — I10 ESSENTIAL (PRIMARY) HYPERTENSION: ICD-10-CM

## 2023-11-13 DIAGNOSIS — E06.3 AUTOIMMUNE THYROIDITIS: ICD-10-CM

## 2023-11-13 DIAGNOSIS — N95.1 MENOPAUSAL AND FEMALE CLIMACTERIC STATES: ICD-10-CM

## 2023-11-13 LAB
ANION GAP SERPL CALCULATED.3IONS-SCNC: 11 MMOL/L (ref 7–15)
BUN SERPL-MCNC: 10.7 MG/DL (ref 6–20)
CALCIUM SERPL-MCNC: 8.7 MG/DL (ref 8.6–10)
CHLORIDE SERPL-SCNC: 109 MMOL/L (ref 98–107)
CHOLEST SERPL-MCNC: 198 MG/DL
CORTIS SERPL-MCNC: 17.3 UG/DL
CREAT SERPL-MCNC: 0.71 MG/DL (ref 0.51–0.95)
DEPRECATED HCO3 PLAS-SCNC: 20 MMOL/L (ref 22–29)
EGFRCR SERPLBLD CKD-EPI 2021: >90 ML/MIN/1.73M2
GLUCOSE SERPL-MCNC: 140 MG/DL (ref 70–99)
HDLC SERPL-MCNC: 53 MG/DL
INSULIN SERPL-ACNC: 15.2 UU/ML (ref 2.6–24.9)
LDLC SERPL CALC-MCNC: 114 MG/DL
NONHDLC SERPL-MCNC: 145 MG/DL
POTASSIUM SERPL-SCNC: 4.1 MMOL/L (ref 3.4–5.3)
SODIUM SERPL-SCNC: 140 MMOL/L (ref 135–145)
T4 FREE SERPL-MCNC: 1.19 NG/DL (ref 0.9–1.7)
TRIGL SERPL-MCNC: 157 MG/DL
TSH SERPL DL<=0.005 MIU/L-ACNC: 4.35 UIU/ML (ref 0.3–4.2)
VIT B12 SERPL-MCNC: 1031 PG/ML (ref 232–1245)
VIT D+METAB SERPL-MCNC: 58 NG/ML (ref 20–50)

## 2023-11-13 PROCEDURE — 82306 VITAMIN D 25 HYDROXY: CPT | Performed by: FAMILY MEDICINE

## 2023-11-13 PROCEDURE — 82607 VITAMIN B-12: CPT | Performed by: FAMILY MEDICINE

## 2023-11-13 PROCEDURE — 80061 LIPID PANEL: CPT | Performed by: FAMILY MEDICINE

## 2023-11-13 PROCEDURE — 84443 ASSAY THYROID STIM HORMONE: CPT | Performed by: FAMILY MEDICINE

## 2023-11-13 PROCEDURE — 83525 ASSAY OF INSULIN: CPT | Performed by: FAMILY MEDICINE

## 2023-11-13 PROCEDURE — 84439 ASSAY OF FREE THYROXINE: CPT | Performed by: FAMILY MEDICINE

## 2023-11-13 PROCEDURE — 86376 MICROSOMAL ANTIBODY EACH: CPT | Performed by: FAMILY MEDICINE

## 2023-11-13 PROCEDURE — 82627 DEHYDROEPIANDROSTERONE: CPT | Performed by: FAMILY MEDICINE

## 2023-11-13 PROCEDURE — 80048 BASIC METABOLIC PNL TOTAL CA: CPT | Performed by: FAMILY MEDICINE

## 2023-11-13 PROCEDURE — 82533 TOTAL CORTISOL: CPT | Performed by: FAMILY MEDICINE

## 2023-11-13 PROCEDURE — 84403 ASSAY OF TOTAL TESTOSTERONE: CPT | Performed by: FAMILY MEDICINE

## 2023-11-14 LAB
DHEA-S SERPL-MCNC: 173 UG/DL (ref 35–430)
THYROPEROXIDASE AB SERPL-ACNC: 34 IU/ML

## 2023-11-15 LAB
BKR LAB AP GYN ADEQUACY: NORMAL
BKR LAB AP GYN INTERPRETATION: NORMAL
BKR LAB AP HPV REFLEX: NORMAL
BKR LAB AP PREVIOUS ABNORMAL: NORMAL
HUMAN PAPILLOMA VIRUS 16 DNA: NEGATIVE
HUMAN PAPILLOMA VIRUS 18 DNA: NEGATIVE
HUMAN PAPILLOMA VIRUS FINAL DIAGNOSIS: NORMAL
HUMAN PAPILLOMA VIRUS OTHER HR: NEGATIVE
PATH REPORT.COMMENTS IMP SPEC: NORMAL
PATH REPORT.COMMENTS IMP SPEC: NORMAL
PATH REPORT.RELEVANT HX SPEC: NORMAL
TESTOST SERPL-MCNC: 16 NG/DL (ref 8–60)

## 2024-04-08 ENCOUNTER — LAB REQUISITION (OUTPATIENT)
Dept: LAB | Facility: CLINIC | Age: 59
End: 2024-04-08

## 2024-04-08 DIAGNOSIS — E11.9 TYPE 2 DIABETES MELLITUS WITHOUT COMPLICATIONS (H): ICD-10-CM

## 2024-04-08 DIAGNOSIS — E06.3 AUTOIMMUNE THYROIDITIS: ICD-10-CM

## 2024-04-08 DIAGNOSIS — E55.9 VITAMIN D DEFICIENCY, UNSPECIFIED: ICD-10-CM

## 2024-04-08 PROCEDURE — 84443 ASSAY THYROID STIM HORMONE: CPT | Performed by: FAMILY MEDICINE

## 2024-04-08 PROCEDURE — 80061 LIPID PANEL: CPT | Performed by: FAMILY MEDICINE

## 2024-04-08 PROCEDURE — 82570 ASSAY OF URINE CREATININE: CPT | Performed by: FAMILY MEDICINE

## 2024-04-08 PROCEDURE — 80053 COMPREHEN METABOLIC PANEL: CPT | Performed by: FAMILY MEDICINE

## 2024-04-08 PROCEDURE — 82306 VITAMIN D 25 HYDROXY: CPT | Performed by: FAMILY MEDICINE

## 2024-04-09 LAB
ALBUMIN SERPL BCG-MCNC: 3.9 G/DL (ref 3.5–5.2)
ALP SERPL-CCNC: 46 U/L (ref 40–150)
ALT SERPL W P-5'-P-CCNC: 12 U/L (ref 0–50)
ANION GAP SERPL CALCULATED.3IONS-SCNC: 12 MMOL/L (ref 7–15)
AST SERPL W P-5'-P-CCNC: 15 U/L (ref 0–45)
BILIRUB SERPL-MCNC: 0.3 MG/DL
BUN SERPL-MCNC: 11.2 MG/DL (ref 6–20)
CALCIUM SERPL-MCNC: 9.4 MG/DL (ref 8.6–10)
CHLORIDE SERPL-SCNC: 107 MMOL/L (ref 98–107)
CHOLEST SERPL-MCNC: 178 MG/DL
CREAT SERPL-MCNC: 0.71 MG/DL (ref 0.51–0.95)
CREAT UR-MCNC: 177 MG/DL
DEPRECATED HCO3 PLAS-SCNC: 23 MMOL/L (ref 22–29)
EGFRCR SERPLBLD CKD-EPI 2021: >90 ML/MIN/1.73M2
FASTING STATUS PATIENT QL REPORTED: ABNORMAL
GLUCOSE SERPL-MCNC: 82 MG/DL (ref 70–99)
HDLC SERPL-MCNC: 46 MG/DL
LDLC SERPL CALC-MCNC: 120 MG/DL
MICROALBUMIN UR-MCNC: 12.6 MG/L
MICROALBUMIN/CREAT UR: 7.12 MG/G CR (ref 0–25)
NONHDLC SERPL-MCNC: 132 MG/DL
POTASSIUM SERPL-SCNC: 3.7 MMOL/L (ref 3.4–5.3)
PROT SERPL-MCNC: 6.4 G/DL (ref 6.4–8.3)
SODIUM SERPL-SCNC: 142 MMOL/L (ref 135–145)
TRIGL SERPL-MCNC: 62 MG/DL
TSH SERPL DL<=0.005 MIU/L-ACNC: 1.2 UIU/ML (ref 0.3–4.2)
VIT D+METAB SERPL-MCNC: 50 NG/ML (ref 20–50)

## 2024-05-12 ENCOUNTER — HEALTH MAINTENANCE LETTER (OUTPATIENT)
Age: 59
End: 2024-05-12

## 2024-09-29 ENCOUNTER — HEALTH MAINTENANCE LETTER (OUTPATIENT)
Age: 59
End: 2024-09-29

## 2024-09-30 ENCOUNTER — LAB REQUISITION (OUTPATIENT)
Dept: LAB | Facility: CLINIC | Age: 59
End: 2024-09-30

## 2024-09-30 DIAGNOSIS — Z80.41 FAMILY HISTORY OF MALIGNANT NEOPLASM OF OVARY: ICD-10-CM

## 2024-09-30 DIAGNOSIS — E11.9 TYPE 2 DIABETES MELLITUS WITHOUT COMPLICATIONS (H): ICD-10-CM

## 2024-09-30 LAB
CANCER AG125 SERPL-ACNC: 19 U/ML
CHOLEST SERPL-MCNC: 191 MG/DL
FASTING STATUS PATIENT QL REPORTED: ABNORMAL
HDLC SERPL-MCNC: 56 MG/DL
INSULIN SERPL-ACNC: 20.9 UU/ML (ref 2.6–24.9)
LDLC SERPL CALC-MCNC: 112 MG/DL
NONHDLC SERPL-MCNC: 135 MG/DL
TRIGL SERPL-MCNC: 117 MG/DL

## 2024-09-30 PROCEDURE — 86304 IMMUNOASSAY TUMOR CA 125: CPT | Performed by: FAMILY MEDICINE

## 2024-09-30 PROCEDURE — 80061 LIPID PANEL: CPT | Performed by: FAMILY MEDICINE

## 2024-09-30 PROCEDURE — 83525 ASSAY OF INSULIN: CPT | Performed by: FAMILY MEDICINE

## 2024-12-08 ENCOUNTER — HEALTH MAINTENANCE LETTER (OUTPATIENT)
Age: 59
End: 2024-12-08

## 2025-01-18 ENCOUNTER — HEALTH MAINTENANCE LETTER (OUTPATIENT)
Age: 60
End: 2025-01-18

## 2025-04-27 ENCOUNTER — HEALTH MAINTENANCE LETTER (OUTPATIENT)
Age: 60
End: 2025-04-27

## 2025-08-10 ENCOUNTER — HEALTH MAINTENANCE LETTER (OUTPATIENT)
Age: 60
End: 2025-08-10